# Patient Record
Sex: FEMALE | Race: BLACK OR AFRICAN AMERICAN | NOT HISPANIC OR LATINO | URBAN - METROPOLITAN AREA
[De-identification: names, ages, dates, MRNs, and addresses within clinical notes are randomized per-mention and may not be internally consistent; named-entity substitution may affect disease eponyms.]

---

## 2017-08-04 ENCOUNTER — OUTPATIENT (OUTPATIENT)
Dept: OUTPATIENT SERVICES | Facility: HOSPITAL | Age: 77
LOS: 1 days | End: 2017-08-04
Payer: MEDICARE

## 2017-08-04 VITALS
RESPIRATION RATE: 18 BRPM | HEIGHT: 62 IN | DIASTOLIC BLOOD PRESSURE: 86 MMHG | TEMPERATURE: 99 F | WEIGHT: 126.99 LBS | HEART RATE: 68 BPM | OXYGEN SATURATION: 97 % | SYSTOLIC BLOOD PRESSURE: 149 MMHG

## 2017-08-04 DIAGNOSIS — N18.5 CHRONIC KIDNEY DISEASE, STAGE 5: ICD-10-CM

## 2017-08-04 DIAGNOSIS — Z98.890 OTHER SPECIFIED POSTPROCEDURAL STATES: Chronic | ICD-10-CM

## 2017-08-04 DIAGNOSIS — Z01.818 ENCOUNTER FOR OTHER PREPROCEDURAL EXAMINATION: ICD-10-CM

## 2017-08-04 DIAGNOSIS — N18.9 CHRONIC KIDNEY DISEASE, UNSPECIFIED: ICD-10-CM

## 2017-08-04 DIAGNOSIS — H26.9 UNSPECIFIED CATARACT: Chronic | ICD-10-CM

## 2017-08-04 PROCEDURE — G0463: CPT

## 2017-08-04 NOTE — H&P PST ADULT - ASSESSMENT
77-year-old female with history of hypertension, dyslipidemia, chronic kidney disease comes to PST prior to upcoming surgery.

## 2017-08-04 NOTE — H&P PST ADULT - PMH
Chronic kidney disease    Dyslipidemia    History of hypertension Borderline diabetes mellitus    Chronic kidney disease    Dyslipidemia    History of hypertension

## 2017-08-04 NOTE — H&P PST ADULT - NSANTHOSAYNRD_GEN_A_CORE
No. GEORGINA screening performed.  STOP BANG Legend: 0-2 = LOW Risk; 3-4 = INTERMEDIATE Risk; 5-8 = HIGH Risk

## 2017-08-07 ENCOUNTER — OUTPATIENT (OUTPATIENT)
Dept: OUTPATIENT SERVICES | Facility: HOSPITAL | Age: 77
LOS: 1 days | Discharge: HOME | End: 2017-08-07
Payer: MEDICARE

## 2017-08-07 VITALS
DIASTOLIC BLOOD PRESSURE: 60 MMHG | RESPIRATION RATE: 17 BRPM | TEMPERATURE: 98 F | OXYGEN SATURATION: 100 % | HEART RATE: 55 BPM | SYSTOLIC BLOOD PRESSURE: 139 MMHG

## 2017-08-07 VITALS
SYSTOLIC BLOOD PRESSURE: 149 MMHG | RESPIRATION RATE: 17 BRPM | WEIGHT: 126.99 LBS | HEART RATE: 86 BPM | TEMPERATURE: 98 F | DIASTOLIC BLOOD PRESSURE: 75 MMHG | HEIGHT: 62 IN | OXYGEN SATURATION: 100 %

## 2017-08-07 DIAGNOSIS — Z98.890 OTHER SPECIFIED POSTPROCEDURAL STATES: Chronic | ICD-10-CM

## 2017-08-07 DIAGNOSIS — Z01.818 ENCOUNTER FOR OTHER PREPROCEDURAL EXAMINATION: ICD-10-CM

## 2017-08-07 DIAGNOSIS — N18.5 CHRONIC KIDNEY DISEASE, STAGE 5: ICD-10-CM

## 2017-08-07 DIAGNOSIS — H26.9 UNSPECIFIED CATARACT: Chronic | ICD-10-CM

## 2017-08-07 PROCEDURE — 36821 AV FUSION DIRECT ANY SITE: CPT | Mod: LT

## 2017-08-07 PROCEDURE — C1889: CPT

## 2017-08-07 RX ORDER — ACETAMINOPHEN WITH CODEINE 300MG-30MG
1 TABLET ORAL EVERY 4 HOURS
Qty: 0 | Refills: 0 | Status: DISCONTINUED | OUTPATIENT
Start: 2017-08-07 | End: 2017-08-07

## 2017-08-07 RX ORDER — ACETAMINOPHEN WITH CODEINE 300MG-30MG
1 TABLET ORAL
Qty: 12 | Refills: 0 | OUTPATIENT
Start: 2017-08-07 | End: 2017-08-10

## 2017-08-07 RX ORDER — SODIUM CHLORIDE 9 MG/ML
3 INJECTION INTRAMUSCULAR; INTRAVENOUS; SUBCUTANEOUS EVERY 8 HOURS
Qty: 0 | Refills: 0 | Status: DISCONTINUED | OUTPATIENT
Start: 2017-08-07 | End: 2017-08-07

## 2017-08-07 RX ORDER — ACETAMINOPHEN 500 MG
1 TABLET ORAL
Qty: 0 | Refills: 0 | COMMUNITY

## 2017-08-07 NOTE — ASU DISCHARGE PLAN (ADULT/PEDIATRIC). - NOTIFY
Persistent Nausea and Vomiting/Bleeding that does not stop/Swelling that continues/Pain not relieved by Medications/Numbness, tingling

## 2017-08-07 NOTE — BRIEF OPERATIVE NOTE - PROCEDURE
AV fistula  08/07/2017    Active  CGASIDDHARTHN <<-----Click on this checkbox to enter Procedure AV fistula  08/07/2017  Creation AV Fistula  [employer]we[employer] Forearm  Active  Henry Garza

## 2017-08-07 NOTE — BRIEF OPERATIVE NOTE - POST-OP DX
End stage renal disease  08/07/2017    Active  Henry Garza End stage renal disease  08/07/2017  End Stage Renal Disease  Active  Angel Drake

## 2017-08-08 ENCOUNTER — TRANSCRIPTION ENCOUNTER (OUTPATIENT)
Age: 77
End: 2017-08-08

## 2017-08-11 DIAGNOSIS — E11.22 TYPE 2 DIABETES MELLITUS WITH DIABETIC CHRONIC KIDNEY DISEASE: ICD-10-CM

## 2017-08-11 DIAGNOSIS — Z87.891 PERSONAL HISTORY OF NICOTINE DEPENDENCE: ICD-10-CM

## 2017-08-11 DIAGNOSIS — I12.0 HYPERTENSIVE CHRONIC KIDNEY DISEASE WITH STAGE 5 CHRONIC KIDNEY DISEASE OR END STAGE RENAL DISEASE: ICD-10-CM

## 2017-08-11 DIAGNOSIS — N18.6 END STAGE RENAL DISEASE: ICD-10-CM

## 2017-10-05 PROBLEM — Z00.00 ENCOUNTER FOR PREVENTIVE HEALTH EXAMINATION: Status: ACTIVE | Noted: 2017-10-05

## 2017-10-23 ENCOUNTER — APPOINTMENT (OUTPATIENT)
Dept: VASCULAR SURGERY | Facility: CLINIC | Age: 77
End: 2017-10-23
Payer: MEDICARE

## 2017-10-23 VITALS
BODY MASS INDEX: 23 KG/M2 | TEMPERATURE: 98 F | WEIGHT: 125 LBS | SYSTOLIC BLOOD PRESSURE: 166 MMHG | HEART RATE: 89 BPM | DIASTOLIC BLOOD PRESSURE: 94 MMHG | HEIGHT: 62 IN

## 2017-10-23 DIAGNOSIS — F15.90 OTHER STIMULANT USE, UNSPECIFIED, UNCOMPLICATED: ICD-10-CM

## 2017-10-23 DIAGNOSIS — Z87.891 PERSONAL HISTORY OF NICOTINE DEPENDENCE: ICD-10-CM

## 2017-10-23 DIAGNOSIS — Z86.79 PERSONAL HISTORY OF OTHER DISEASES OF THE CIRCULATORY SYSTEM: ICD-10-CM

## 2017-10-23 DIAGNOSIS — Z86.39 PERSONAL HISTORY OF OTHER ENDOCRINE, NUTRITIONAL AND METABOLIC DISEASE: ICD-10-CM

## 2017-10-23 DIAGNOSIS — Q78.2 OSTEOPETROSIS: ICD-10-CM

## 2017-10-23 DIAGNOSIS — Z86.69 PERSONAL HISTORY OF OTHER DISEASES OF THE NERVOUS SYSTEM AND SENSE ORGANS: ICD-10-CM

## 2017-10-23 DIAGNOSIS — Z86.2 PERSONAL HISTORY OF DISEASES OF THE BLOOD AND BLOOD-FORMING ORGANS AND CERTAIN DISORDERS INVOLVING THE IMMUNE MECHANISM: ICD-10-CM

## 2017-10-23 DIAGNOSIS — R73.03 PREDIABETES.: ICD-10-CM

## 2017-10-23 DIAGNOSIS — I10 ESSENTIAL (PRIMARY) HYPERTENSION: ICD-10-CM

## 2017-10-23 DIAGNOSIS — I77.0 ARTERIOVENOUS FISTULA, ACQUIRED: ICD-10-CM

## 2017-10-23 PROCEDURE — 93990 DOPPLER FLOW TESTING: CPT

## 2017-10-23 PROCEDURE — 99204 OFFICE O/P NEW MOD 45 MIN: CPT

## 2017-10-23 PROCEDURE — G0365: CPT | Mod: 59

## 2017-10-23 RX ORDER — VITAMIN B COMPLEX
CAPSULE ORAL
Qty: 30 | Refills: 0 | Status: ACTIVE | COMMUNITY
Start: 2016-11-30

## 2017-10-23 RX ORDER — VITAMIN K2 90 MCG
125 MCG CAPSULE ORAL
Refills: 0 | Status: ACTIVE | COMMUNITY

## 2017-10-23 RX ORDER — VITAMIN B COMPLEX
CAPSULE ORAL
Refills: 0 | Status: ACTIVE | COMMUNITY

## 2017-10-23 RX ORDER — ATORVASTATIN CALCIUM 20 MG/1
20 TABLET, FILM COATED ORAL
Qty: 30 | Refills: 0 | Status: ACTIVE | COMMUNITY
Start: 2017-06-16

## 2017-11-20 ENCOUNTER — OUTPATIENT (OUTPATIENT)
Dept: OUTPATIENT SERVICES | Facility: HOSPITAL | Age: 77
LOS: 1 days | End: 2017-11-20
Payer: MEDICARE

## 2017-11-20 VITALS
SYSTOLIC BLOOD PRESSURE: 134 MMHG | RESPIRATION RATE: 16 BRPM | WEIGHT: 125 LBS | DIASTOLIC BLOOD PRESSURE: 87 MMHG | OXYGEN SATURATION: 99 % | HEIGHT: 62 IN | HEART RATE: 66 BPM | TEMPERATURE: 98 F

## 2017-11-20 DIAGNOSIS — Z01.818 ENCOUNTER FOR OTHER PREPROCEDURAL EXAMINATION: ICD-10-CM

## 2017-11-20 DIAGNOSIS — Z98.890 OTHER SPECIFIED POSTPROCEDURAL STATES: Chronic | ICD-10-CM

## 2017-11-20 DIAGNOSIS — I77.0 ARTERIOVENOUS FISTULA, ACQUIRED: Chronic | ICD-10-CM

## 2017-11-20 DIAGNOSIS — N18.6 END STAGE RENAL DISEASE: ICD-10-CM

## 2017-11-20 DIAGNOSIS — H26.9 UNSPECIFIED CATARACT: Chronic | ICD-10-CM

## 2017-11-20 PROCEDURE — G0463: CPT

## 2017-11-20 RX ORDER — SODIUM CHLORIDE 9 MG/ML
3 INJECTION INTRAMUSCULAR; INTRAVENOUS; SUBCUTANEOUS EVERY 8 HOURS
Qty: 0 | Refills: 0 | Status: DISCONTINUED | OUTPATIENT
Start: 2017-11-30 | End: 2017-12-01

## 2017-11-20 NOTE — H&P PST ADULT - PROBLEM SELECTOR PLAN 1
Scheduled for left basilic arteriovenous fistula on 11/30/2017. Preoperative instructions discussed and given to patient. Verbalized understanding of instructions

## 2017-11-20 NOTE — H&P PST ADULT - GASTROINTESTINAL DETAILS
no distention/no rigidity/no organomegaly/no masses palpable/bowel sounds normal/no bruit/soft/no rebound tenderness/nontender/no guarding

## 2017-11-20 NOTE — H&P PST ADULT - ACTIVITY
Walks >3 blocks and climbs >2 FOS w/o complications Walks >3 blocks and avoids going upstairs due to dyspnea

## 2017-11-20 NOTE — H&P PST ADULT - RS GEN PE MLT RESP DETAILS PC
airway patent/no intercostal retractions/no rales/good air movement/clear to auscultation bilaterally/no subcutaneous emphysema/no chest wall tenderness/no wheezes/no rhonchi/respirations non-labored/breath sounds equal

## 2017-11-20 NOTE — H&P PST ADULT - ASSESSMENT
76 y/o female with PMH of HTN,  HLD, Anemia of chronic disease, Stage 1V CKD, ERSD scheduled for left basilic arteriovenous fistula on 11/30/2017. Patient is at moderate risk for planned procedure

## 2017-11-20 NOTE — H&P PST ADULT - PSH
Cataracts, bilateral    History of colonoscopy A-V fistula  left arm - 8/7/2017  Cataracts, bilateral  w/left lens implant  History of colonoscopy

## 2017-11-20 NOTE — H&P PST ADULT - PRO PAIN LIFE ADAPT
inability to sleep/decreased activity level/inability or reluctance to perform ADLs/inability to enjoy life

## 2017-11-20 NOTE — H&P PST ADULT - NEGATIVE CARDIOVASCULAR SYMPTOMS
no orthopnea/no palpitations/no peripheral edema/no chest pain/no dyspnea on exertion/no claudication

## 2017-11-20 NOTE — H&P PST ADULT - NEGATIVE RESPIRATORY AND THORAX SYMPTOMS
no cough/no hemoptysis/no dyspnea/no wheezing/no pleuritic chest pain no hemoptysis/no pleuritic chest pain/no wheezing/no dyspnea

## 2017-11-20 NOTE — H&P PST ADULT - HISTORY OF PRESENT ILLNESS
76 y/o female with PMH of HTN,  HLD, Anemia of chronic disease, ESRD on hemodialysis since      is here today for presurgical evaluation. She is scheduled for left basilic arteriovenous fistula on 11/30/2017 78 y/o female with PMH of HTN,  HLD, Anemia of chronic disease, Stage 1V CKD, is here today for presurgical evaluation. She is scheduled for left basilic arteriovenous fistula on 11/30/2017, in preparation to start hemodialysis 76 y/o female with PMH of HTN, HLD, Anemia of chronic disease, Stage 1V CKD, is here today for presurgical evaluation. She is scheduled for left basilic arteriovenous fistula on 11/30/2017, in preparation for hemodialysis

## 2017-11-30 ENCOUNTER — APPOINTMENT (OUTPATIENT)
Dept: VASCULAR SURGERY | Facility: HOSPITAL | Age: 77
End: 2017-11-30

## 2017-11-30 ENCOUNTER — INPATIENT (INPATIENT)
Facility: HOSPITAL | Age: 77
LOS: 0 days | Discharge: HOME | DRG: 673 | End: 2017-12-01
Attending: SURGERY | Admitting: SURGERY
Payer: MEDICARE

## 2017-11-30 VITALS
WEIGHT: 125 LBS | HEIGHT: 62 IN | DIASTOLIC BLOOD PRESSURE: 79 MMHG | OXYGEN SATURATION: 100 % | TEMPERATURE: 98 F | HEART RATE: 84 BPM | SYSTOLIC BLOOD PRESSURE: 131 MMHG | RESPIRATION RATE: 16 BRPM

## 2017-11-30 DIAGNOSIS — N18.6 END STAGE RENAL DISEASE: ICD-10-CM

## 2017-11-30 DIAGNOSIS — Z98.890 OTHER SPECIFIED POSTPROCEDURAL STATES: Chronic | ICD-10-CM

## 2017-11-30 DIAGNOSIS — Z01.818 ENCOUNTER FOR OTHER PREPROCEDURAL EXAMINATION: ICD-10-CM

## 2017-11-30 DIAGNOSIS — I77.0 ARTERIOVENOUS FISTULA, ACQUIRED: Chronic | ICD-10-CM

## 2017-11-30 DIAGNOSIS — H26.9 UNSPECIFIED CATARACT: Chronic | ICD-10-CM

## 2017-11-30 DIAGNOSIS — N18.4 CHRONIC KIDNEY DISEASE, STAGE 4 (SEVERE): ICD-10-CM

## 2017-11-30 LAB
ANION GAP SERPL CALC-SCNC: 10 MMOL/L — SIGNIFICANT CHANGE UP (ref 5–17)
BUN SERPL-MCNC: 42 MG/DL — HIGH (ref 7–18)
CALCIUM SERPL-MCNC: 9.5 MG/DL — SIGNIFICANT CHANGE UP (ref 8.4–10.5)
CHLORIDE SERPL-SCNC: 109 MMOL/L — HIGH (ref 96–108)
CO2 SERPL-SCNC: 21 MMOL/L — LOW (ref 22–31)
CREAT SERPL-MCNC: 3.07 MG/DL — HIGH (ref 0.5–1.3)
GLUCOSE BLDC GLUCOMTR-MCNC: 110 MG/DL — HIGH (ref 70–99)
GLUCOSE SERPL-MCNC: 108 MG/DL — HIGH (ref 70–99)
POTASSIUM SERPL-MCNC: 3.9 MMOL/L — SIGNIFICANT CHANGE UP (ref 3.5–5.3)
POTASSIUM SERPL-SCNC: 3.9 MMOL/L — SIGNIFICANT CHANGE UP (ref 3.5–5.3)
SODIUM SERPL-SCNC: 140 MMOL/L — SIGNIFICANT CHANGE UP (ref 135–145)

## 2017-11-30 RX ORDER — OXYCODONE AND ACETAMINOPHEN 5; 325 MG/1; MG/1
1 TABLET ORAL EVERY 6 HOURS
Qty: 0 | Refills: 0 | Status: DISCONTINUED | OUTPATIENT
Start: 2017-11-30 | End: 2017-12-01

## 2017-11-30 RX ORDER — ATORVASTATIN CALCIUM 80 MG/1
20 TABLET, FILM COATED ORAL AT BEDTIME
Qty: 0 | Refills: 0 | Status: DISCONTINUED | OUTPATIENT
Start: 2017-11-30 | End: 2017-12-01

## 2017-11-30 RX ORDER — AMLODIPINE BESYLATE 2.5 MG/1
5 TABLET ORAL DAILY
Qty: 0 | Refills: 0 | Status: DISCONTINUED | OUTPATIENT
Start: 2017-11-30 | End: 2017-12-01

## 2017-11-30 RX ORDER — INFLUENZA VIRUS VACCINE 15; 15; 15; 15 UG/.5ML; UG/.5ML; UG/.5ML; UG/.5ML
0.5 SUSPENSION INTRAMUSCULAR ONCE
Qty: 0 | Refills: 0 | Status: DISCONTINUED | OUTPATIENT
Start: 2017-11-30 | End: 2017-12-01

## 2017-11-30 RX ORDER — FOLIC ACID 0.8 MG
1 TABLET ORAL
Qty: 0 | Refills: 0 | COMMUNITY

## 2017-11-30 RX ORDER — FOLIC ACID 0.8 MG
1 TABLET ORAL DAILY
Qty: 0 | Refills: 0 | Status: DISCONTINUED | OUTPATIENT
Start: 2017-11-30 | End: 2017-12-01

## 2017-11-30 RX ORDER — FERROUS SULFATE 325(65) MG
325 TABLET ORAL
Qty: 0 | Refills: 0 | Status: DISCONTINUED | OUTPATIENT
Start: 2017-11-30 | End: 2017-12-01

## 2017-11-30 RX ORDER — HYDROMORPHONE HYDROCHLORIDE 2 MG/ML
0.3 INJECTION INTRAMUSCULAR; INTRAVENOUS; SUBCUTANEOUS
Qty: 0 | Refills: 0 | Status: DISCONTINUED | OUTPATIENT
Start: 2017-11-30 | End: 2017-11-30

## 2017-11-30 RX ORDER — FERROUS SULFATE 325(65) MG
1 TABLET ORAL
Qty: 0 | Refills: 0 | COMMUNITY

## 2017-11-30 RX ADMIN — Medication 325 MILLIGRAM(S): at 17:07

## 2017-11-30 RX ADMIN — ATORVASTATIN CALCIUM 20 MILLIGRAM(S): 80 TABLET, FILM COATED ORAL at 21:13

## 2017-11-30 RX ADMIN — SODIUM CHLORIDE 3 MILLILITER(S): 9 INJECTION INTRAMUSCULAR; INTRAVENOUS; SUBCUTANEOUS at 21:11

## 2017-11-30 RX ADMIN — SODIUM CHLORIDE 3 MILLILITER(S): 9 INJECTION INTRAMUSCULAR; INTRAVENOUS; SUBCUTANEOUS at 08:52

## 2017-11-30 NOTE — ASU DISCHARGE PLAN (ADULT/PEDIATRIC). - NOTIFY
Pain not relieved by Medications/Numbness, tingling/Fever greater than 101/Bleeding that does not stop/Numbness, color, or temperature change to extremity/Persistent Nausea and Vomiting Fever greater than 101/Pain not relieved by Medications/Swelling that continues/Numbness, color, or temperature change to extremity/Numbness, tingling/Persistent Nausea and Vomiting/Bleeding that does not stop

## 2017-11-30 NOTE — PROGRESS NOTE ADULT - SUBJECTIVE AND OBJECTIVE BOX
POSTOP NOTE  Patient is doing okay.        Vital Signs Last 24 Hrs  T(C): 37.4 (30 Nov 2017 21:32), Max: 37.4 (30 Nov 2017 21:32)  T(F): 99.3 (30 Nov 2017 21:32), Max: 99.3 (30 Nov 2017 21:32)  HR: 79 (30 Nov 2017 21:32) (75 - 92)  BP: 116/48 (30 Nov 2017 21:32) (115/65 - 131/79)  BP(mean): 73 (30 Nov 2017 12:47) (73 - 73)  RR: 15 (30 Nov 2017 21:32) (12 - 17)  SpO2: 96% (30 Nov 2017 21:32) (95% - 100%)    Daily Height in cm: 157.48 (30 Nov 2017 08:46)    Daily     I&O's Detail      MEDICATIONS  (STANDING):  amLODIPine   Tablet 5 milliGRAM(s) Oral daily  atorvastatin 20 milliGRAM(s) Oral at bedtime  ferrous    sulfate 325 milliGRAM(s) Oral three times a day with meals  folic acid 1 milliGRAM(s) Oral daily  influenza   Vaccine 0.5 milliLiter(s) IntraMuscular once  sodium chloride 0.9% lock flush 3 milliLiter(s) IV Push every 8 hours  vitamin B complex with vitamin C 1 Tablet(s) Oral daily    MEDICATIONS  (PRN):  oxyCODONE    5 mG/acetaminophen 325 mG 1 Tablet(s) Oral every 6 hours PRN Severe Pain (7 - 10)      PHYSICAL EXAM:  GENERAL: In no acute distress  CHEST/LUNG: Clear to percussion bilaterally; Normal respiratory effort  HEART: Regular rate and rhythm  ABDOMEN: Soft, Nontender, Nondistended; Bowel sounds present  EXTREMITIES:  Lt Basilic AVF; Moderate serosanguinous dressing soilage, EMRE serosanguinous    LABS:    Neutrophil %:   11-30    140  |  109<H>  |  42<H>  ----------------------------<  108<H>  3.9   |  21<L>  |  3.07<H>    Ca    9.5      30 Nov 2017 08:51            RADIOLOGY & ADDITIONAL STUDIES:

## 2017-11-30 NOTE — ASU DISCHARGE PLAN (ADULT/PEDIATRIC). - NURSING INSTRUCTIONS
instruct pt on drain care KEEP DRESSING DRY UNTIL SEEN BY DR Klein.   Empty drain as instructed prior to discharge

## 2017-11-30 NOTE — PATIENT PROFILE ADULT. - VISION (WITH CORRECTIVE LENSES IF THE PATIENT USUALLY WEARS THEM):
Partially impaired: cannot see medication labels or newsprint, but can see obstacles in path, and the surrounding layout; can count fingers at arm's length/wears glasses for reading

## 2017-11-30 NOTE — ASU DISCHARGE PLAN (ADULT/PEDIATRIC). - MEDICATION SUMMARY - MEDICATIONS TO TAKE
I will START or STAY ON the medications listed below when I get home from the hospital:    oxyCODONE-acetaminophen 5 mg-325 mg oral tablet  -- 1 tab(s) by mouth every 6 hours, As needed, Severe Pain (7 - 10) MDD:4 tabs  -- Indication: For pain    atorvastatin 20 mg oral tablet  -- 1 tab(s) by mouth once a day  -- Indication: For high cholesterol    amLODIPine 5 mg oral tablet  -- 1 tab(s) by mouth once a day  -- Indication: For high blood pressure    ferrous sulfate  -- 1 tab(s) by mouth 3 times a day  -- Indication: For anemia    B Complex 50  -- 1 tab(s) by mouth once a day  -- Indication: For suppliment    folic acid 1 mg oral tablet  -- 1 tab(s) by mouth once a day  -- Indication: For suppliment

## 2017-11-30 NOTE — ASU DISCHARGE PLAN (ADULT/PEDIATRIC). - FOLLOWUP APPOINTMENT CLINIC/PHYSICIAN
Dr Klein ([facility]) 12/15/17 @ 11:15 Dr Klein ([place]) 12/15/17 @ 11:15

## 2017-11-30 NOTE — ASU DISCHARGE PLAN (ADULT/PEDIATRIC). - COMMENTS
appointment on 12/4/17 in [place] office   [address] [address]  appointment on 12/15/17 in [facility] office  [address], first floor between pharmacy and the [facility] office address   [address] [facility], first floor between pharmacy and the [facility]

## 2017-12-01 ENCOUNTER — TRANSCRIPTION ENCOUNTER (OUTPATIENT)
Age: 77
End: 2017-12-01

## 2017-12-01 ENCOUNTER — APPOINTMENT (OUTPATIENT)
Dept: VASCULAR SURGERY | Facility: CLINIC | Age: 77
End: 2017-12-01

## 2017-12-01 VITALS
SYSTOLIC BLOOD PRESSURE: 136 MMHG | TEMPERATURE: 99 F | HEART RATE: 86 BPM | DIASTOLIC BLOOD PRESSURE: 69 MMHG | OXYGEN SATURATION: 100 % | RESPIRATION RATE: 16 BRPM

## 2017-12-01 RX ADMIN — Medication 325 MILLIGRAM(S): at 08:42

## 2017-12-01 RX ADMIN — SODIUM CHLORIDE 3 MILLILITER(S): 9 INJECTION INTRAMUSCULAR; INTRAVENOUS; SUBCUTANEOUS at 06:00

## 2017-12-01 RX ADMIN — AMLODIPINE BESYLATE 5 MILLIGRAM(S): 2.5 TABLET ORAL at 06:01

## 2017-12-01 NOTE — PROGRESS NOTE ADULT - ASSESSMENT
78 y/o Female s/p lt AVF 11/30, POD#1    -DC EMRE in AM   -Pain medication PRN   -Renal Diet   -OOB/ Ambulate   -DVT ppx   -DC home today

## 2017-12-01 NOTE — DISCHARGE NOTE ADULT - MEDICATION SUMMARY - MEDICATIONS TO TAKE
I will START or STAY ON the medications listed below when I get home from the hospital:    oxyCODONE-acetaminophen 5 mg-325 mg oral tablet  -- 1 tab(s) by mouth every 6 hours, As needed, Severe Pain (7 - 10) MDD:4 tabs  -- Indication: For pain    atorvastatin 20 mg oral tablet  -- 1 tab(s) by mouth once a day  -- Indication: For as directed    amLODIPine 5 mg oral tablet  -- 1 tab(s) by mouth once a day  -- Indication: For as directed    ferrous sulfate  -- 1 tab(s) by mouth 3 times a day  -- Indication: For as directed    B Complex 50  -- 1 tab(s) by mouth once a day  -- Indication: For as directed    folic acid 1 mg oral tablet  -- 1 tab(s) by mouth once a day  -- Indication: For as directed

## 2017-12-01 NOTE — DISCHARGE NOTE ADULT - HOSPITAL COURSE
77 year old female with esrd underwent creation of left AV fistula on 11/30. MERE drain was left in place and removed on post-op day 1. Patient did wekk post-op and was discharged home with pain medicine and appropriate follow-up.

## 2017-12-01 NOTE — DISCHARGE NOTE ADULT - CARE PLAN
Principal Discharge DX:	A-V fistula  Goal:	resume regular activities  Instructions for follow-up, activity and diet:	follow-up with Dr. Klein in office in one week, cleanse wound with soap and water daily, cover with dry gauze

## 2017-12-01 NOTE — DISCHARGE NOTE ADULT - PLAN OF CARE
resume regular activities follow-up with Dr. Klein in office in one week, cleanse wound with soap and water daily, cover with dry gauze

## 2017-12-01 NOTE — DISCHARGE NOTE ADULT - CARE PROVIDER_API CALL
Zen Klein), Surgery; Vascular Surgery  [address]  [address]  [place] [address]  Phone: [phone]  Fax: [phone]

## 2017-12-01 NOTE — PROGRESS NOTE ADULT - SUBJECTIVE AND OBJECTIVE BOX
INTERVAL HPI/OVERNIGHT EVENTS:    Pt s/p lt AVF 11/30, POD#1. Pt seen and examined at bedside. Admits to incisional pain, denies numbness or tingling to the Lt UE. Denies fever, chills, SOB or CP.     Vital Signs Last 24 Hrs  T(C): 36.8 (01 Dec 2017 05:52), Max: 37.4 (30 Nov 2017 21:32)  T(F): 98.3 (01 Dec 2017 05:52), Max: 99.3 (30 Nov 2017 21:32)  HR: 73 (01 Dec 2017 05:52) (73 - 92)  BP: 120/55 (01 Dec 2017 05:52) (113/54 - 131/79)  BP(mean): 73 (30 Nov 2017 12:47) (73 - 73)  RR: 16 (01 Dec 2017 05:52) (12 - 17)  SpO2: 97% (01 Dec 2017 05:52) (95% - 100%)  I&O's Detail    30 Nov 2017 07:01  -  01 Dec 2017 07:00  --------------------------------------------------------  IN:  Total IN: 0 mL    OUT:    Bulb: 10 mL  Total OUT: 10 mL    Total NET: -10 mL        sodium chloride 0.9% lock flush 3 milliLiter(s) IV Push every 8 hours      Physical Exam  General: AAOx3, No acute distress  Skin: No jaundice, no icterus  Extremities: Lt UE, AVF w/ good thrill, dressing min saturated w/ ss output, min TTP, EMRE i n place, 10 cc/ ss output, motor and sensory intact

## 2017-12-01 NOTE — DISCHARGE NOTE ADULT - PATIENT PORTAL LINK FT
“You can access the [facility] Patient Portal, offered by [facility], by registering with the following website: [email or web address]”

## 2017-12-05 ENCOUNTER — TRANSCRIPTION ENCOUNTER (OUTPATIENT)
Age: 77
End: 2017-12-05

## 2017-12-11 DIAGNOSIS — N18.6 END STAGE RENAL DISEASE: ICD-10-CM

## 2017-12-11 DIAGNOSIS — E78.5 HYPERLIPIDEMIA, UNSPECIFIED: ICD-10-CM

## 2017-12-11 DIAGNOSIS — E11.22 TYPE 2 DIABETES MELLITUS WITH DIABETIC CHRONIC KIDNEY DISEASE: ICD-10-CM

## 2017-12-11 DIAGNOSIS — I12.0 HYPERTENSIVE CHRONIC KIDNEY DISEASE WITH STAGE 5 CHRONIC KIDNEY DISEASE OR END STAGE RENAL DISEASE: ICD-10-CM

## 2017-12-11 DIAGNOSIS — D64.9 ANEMIA, UNSPECIFIED: ICD-10-CM

## 2017-12-15 ENCOUNTER — APPOINTMENT (OUTPATIENT)
Dept: VASCULAR SURGERY | Facility: CLINIC | Age: 77
End: 2017-12-15
Payer: MEDICARE

## 2017-12-15 VITALS
WEIGHT: 125 LBS | HEIGHT: 62 IN | SYSTOLIC BLOOD PRESSURE: 144 MMHG | HEART RATE: 80 BPM | DIASTOLIC BLOOD PRESSURE: 72 MMHG | BODY MASS INDEX: 23 KG/M2 | TEMPERATURE: 98.3 F

## 2017-12-15 PROCEDURE — 99024 POSTOP FOLLOW-UP VISIT: CPT

## 2017-12-19 PROCEDURE — 82962 GLUCOSE BLOOD TEST: CPT

## 2017-12-19 PROCEDURE — C1889: CPT

## 2017-12-19 PROCEDURE — 80048 BASIC METABOLIC PNL TOTAL CA: CPT

## 2018-01-19 ENCOUNTER — APPOINTMENT (OUTPATIENT)
Dept: VASCULAR SURGERY | Facility: CLINIC | Age: 78
End: 2018-01-19
Payer: MEDICARE

## 2018-01-19 VITALS
HEART RATE: 75 BPM | HEIGHT: 62 IN | BODY MASS INDEX: 21.16 KG/M2 | WEIGHT: 115 LBS | DIASTOLIC BLOOD PRESSURE: 81 MMHG | TEMPERATURE: 98.6 F | SYSTOLIC BLOOD PRESSURE: 181 MMHG

## 2018-01-19 PROCEDURE — 93990 DOPPLER FLOW TESTING: CPT

## 2018-01-19 PROCEDURE — 99024 POSTOP FOLLOW-UP VISIT: CPT

## 2018-03-16 ENCOUNTER — APPOINTMENT (OUTPATIENT)
Dept: VASCULAR SURGERY | Facility: CLINIC | Age: 78
End: 2018-03-16
Payer: MEDICARE

## 2018-03-16 VITALS
WEIGHT: 125 LBS | BODY MASS INDEX: 23 KG/M2 | SYSTOLIC BLOOD PRESSURE: 145 MMHG | DIASTOLIC BLOOD PRESSURE: 75 MMHG | OXYGEN SATURATION: 99 % | HEIGHT: 62 IN | HEART RATE: 77 BPM

## 2018-03-16 PROCEDURE — 99214 OFFICE O/P EST MOD 30 MIN: CPT

## 2018-03-16 PROCEDURE — 93990 DOPPLER FLOW TESTING: CPT

## 2018-06-15 ENCOUNTER — APPOINTMENT (OUTPATIENT)
Dept: VASCULAR SURGERY | Facility: CLINIC | Age: 78
End: 2018-06-15
Payer: MEDICARE

## 2018-06-15 VITALS
BODY MASS INDEX: 23.37 KG/M2 | WEIGHT: 127 LBS | SYSTOLIC BLOOD PRESSURE: 136 MMHG | HEART RATE: 88 BPM | DIASTOLIC BLOOD PRESSURE: 73 MMHG | TEMPERATURE: 98.4 F | HEIGHT: 62 IN

## 2018-06-15 PROCEDURE — 93990 DOPPLER FLOW TESTING: CPT

## 2018-06-15 PROCEDURE — 99214 OFFICE O/P EST MOD 30 MIN: CPT

## 2018-07-17 NOTE — PATIENT PROFILE ADULT. - LAST TOBACCO USE (DD-MM-YY)
87M with diastolic HF, HTN, DM, dementia presents with SOB 87M with diastolic HF, HTN, HLC, NIDDM2, dementia presents with SOB for the past 3 days, started after having to strain for constipation.  Hx mostly obtained from wife since patient with dementia.  Patient had a hard time sleeping the night prior, unable to sleep with 2 pillows, sat up most of the night, and still had trouble moving around in the house earlier today associated with abd distention and LE edema, wife called PMD, who advised to come to ED for further evaluation.  Denied sick contact, fever/chill, dysuria, chest pain/palpitation or syncope.  Stated to be compliant with meds and diet. 87M with diastolic HF, HTN, HLC, hypothyroid, DM2, dementia presents with SOB for the past 3 days, started after having to strain for constipation.  Hx mostly obtained from wife since patient with dementia.  Patient had a hard time sleeping the night prior, unable to sleep with 2 pillows, sat up most of the night, and still had trouble moving around in the house earlier today associated with abd distention and LE edema, wife called PMD, who advised to come to ED for further evaluation.  Denied sick contact, fever/chill, dysuria, chest pain/palpitation or syncope.  Stated to be compliant with meds and diet. 07-Aug-2012

## 2018-08-12 NOTE — DISCHARGE NOTE ADULT - LAUNCH MEDICATION RECONCILIATION
<<-----Click here for Discharge Medication Review
Regular rate and rhythm, Heart sounds S1 S2 present, no murmurs, rubs or gallops

## 2018-09-28 ENCOUNTER — APPOINTMENT (OUTPATIENT)
Dept: VASCULAR SURGERY | Facility: CLINIC | Age: 78
End: 2018-09-28
Payer: MEDICARE

## 2018-09-28 PROBLEM — N18.9 CHRONIC KIDNEY DISEASE, UNSPECIFIED: Chronic | Status: ACTIVE | Noted: 2017-08-04

## 2018-09-28 PROBLEM — R73.03 PREDIABETES: Chronic | Status: ACTIVE | Noted: 2017-08-04

## 2018-09-28 PROBLEM — E78.5 HYPERLIPIDEMIA, UNSPECIFIED: Chronic | Status: ACTIVE | Noted: 2017-08-04

## 2018-09-28 PROBLEM — Z86.79 PERSONAL HISTORY OF OTHER DISEASES OF THE CIRCULATORY SYSTEM: Chronic | Status: ACTIVE | Noted: 2017-08-04

## 2018-09-28 PROCEDURE — 99213 OFFICE O/P EST LOW 20 MIN: CPT

## 2018-09-28 PROCEDURE — 93990 DOPPLER FLOW TESTING: CPT

## 2019-01-04 ENCOUNTER — APPOINTMENT (OUTPATIENT)
Dept: VASCULAR SURGERY | Facility: CLINIC | Age: 79
End: 2019-01-04
Payer: MEDICARE

## 2019-01-04 PROCEDURE — 99214 OFFICE O/P EST MOD 30 MIN: CPT

## 2019-01-04 PROCEDURE — 93990 DOPPLER FLOW TESTING: CPT

## 2019-01-15 ENCOUNTER — FORM ENCOUNTER (OUTPATIENT)
Age: 79
End: 2019-01-15

## 2019-01-15 NOTE — DISCUSSION/SUMMARY
[Post Fistulogram/Intervention] : Post Fistulogram/Intervention: Site check for bleeding/hematoma, thrill/bruit. Vitals signs: On admission, then q15 mins x 2

## 2019-01-15 NOTE — REASON FOR VISIT
[Other ___] : a [unfilled] visit for [Inadequate Flow within AVF] : inadequate flow within AVF [Non-Maturing Fistula] : non-maturing fistula

## 2019-01-16 ENCOUNTER — APPOINTMENT (OUTPATIENT)
Dept: ENDOVASCULAR SURGERY | Facility: CLINIC | Age: 79
End: 2019-01-16
Payer: MEDICARE

## 2019-01-16 VITALS
TEMPERATURE: 97 F | HEART RATE: 74 BPM | OXYGEN SATURATION: 97 % | HEIGHT: 62 IN | SYSTOLIC BLOOD PRESSURE: 158 MMHG | DIASTOLIC BLOOD PRESSURE: 71 MMHG | WEIGHT: 127 LBS | BODY MASS INDEX: 23.37 KG/M2 | RESPIRATION RATE: 16 BRPM

## 2019-01-16 PROCEDURE — 36902Z: CUSTOM

## 2019-01-16 RX ORDER — MECLIZINE HYDROCHLORIDE 25 MG/1
25 TABLET ORAL
Qty: 30 | Refills: 0 | Status: DISCONTINUED | COMMUNITY
Start: 2017-06-16 | End: 2019-01-16

## 2019-01-16 RX ORDER — AMLODIPINE BESYLATE 5 MG/1
5 TABLET ORAL
Refills: 0 | Status: DISCONTINUED | COMMUNITY
End: 2019-01-16

## 2019-01-16 RX ORDER — CHLORHEXIDINE GLUCONATE 4 %
325 (65 FE) LIQUID (ML) TOPICAL
Qty: 90 | Refills: 0 | Status: DISCONTINUED | COMMUNITY
Start: 2017-06-16 | End: 2019-01-16

## 2019-01-16 RX ORDER — MECLIZINE HYDROCHLORIDE 25 MG/1
25 TABLET ORAL
Refills: 0 | Status: DISCONTINUED | COMMUNITY
End: 2019-01-16

## 2019-01-16 RX ORDER — OXYCODONE AND ACETAMINOPHEN 5; 325 MG/1; MG/1
5-325 TABLET ORAL
Qty: 15 | Refills: 0 | Status: DISCONTINUED | COMMUNITY
Start: 2017-11-30 | End: 2019-01-16

## 2019-01-16 RX ORDER — ACETAMINOPHEN AND CODEINE 300; 30 MG/1; MG/1
300-30 TABLET ORAL
Qty: 12 | Refills: 0 | Status: DISCONTINUED | COMMUNITY
Start: 2017-08-07 | End: 2019-01-16

## 2019-01-16 RX ORDER — FOLIC ACID 1 MG/1
1 TABLET ORAL
Qty: 30 | Refills: 0 | Status: DISCONTINUED | COMMUNITY
Start: 2017-06-16 | End: 2019-01-16

## 2019-01-16 RX ORDER — AMLODIPINE BESYLATE 5 MG/1
5 TABLET ORAL
Qty: 30 | Refills: 0 | Status: DISCONTINUED | COMMUNITY
Start: 2016-09-05 | End: 2019-01-16

## 2019-01-16 NOTE — HISTORY OF PRESENT ILLNESS
[] : left basilic fistula [FreeTextEntry1] : basilic transposition fistula 11/20/2017 by Dr. Klein [FreeTextEntry5] : 1/15/2019 [FreeTextEntry6] : Dr. Isaac

## 2019-01-16 NOTE — PAST MEDICAL HISTORY
[Increasing age ( >40 years old)] : Increasing age ( >40 years old) [No therapy indicated for cases scheduled for less than one hour] : No therapy indicated for cases scheduled for less than one hour. [FreeTextEntry1] : Malignant Hyperthermia Screening Tool and Risk of Bleeding Assessment\par \par Ms. URSULA MÉNDEZ denies family history of unexpected death following Anesthesia or Exercise.\par Denies Family history of Malignant Hyperthermia, Muscle or Neuromuscular disorder and High Temperature following exercise.\par \par Ms. URSULA MÉNDEZ denies history of Muscle Spasm, Dark or Chocolate - Colored urine and Unanticipated fever immediately following anesthesia or serious exercise. \par Ms. MÉNDEZ also denies bleeding tendencies/ Risks of Bleeding.\par

## 2019-04-18 ENCOUNTER — OTHER (OUTPATIENT)
Age: 79
End: 2019-04-18

## 2019-04-19 ENCOUNTER — APPOINTMENT (OUTPATIENT)
Dept: VASCULAR SURGERY | Facility: CLINIC | Age: 79
End: 2019-04-19

## 2019-04-22 ENCOUNTER — APPOINTMENT (OUTPATIENT)
Dept: VASCULAR SURGERY | Facility: CLINIC | Age: 79
End: 2019-04-22

## 2019-05-14 ENCOUNTER — APPOINTMENT (OUTPATIENT)
Dept: ENDOVASCULAR SURGERY | Facility: CLINIC | Age: 79
End: 2019-05-14

## 2019-05-20 ENCOUNTER — FORM ENCOUNTER (OUTPATIENT)
Age: 79
End: 2019-05-20

## 2019-05-21 ENCOUNTER — APPOINTMENT (OUTPATIENT)
Dept: ENDOVASCULAR SURGERY | Facility: CLINIC | Age: 79
End: 2019-05-21
Payer: MEDICARE

## 2019-05-21 VITALS
HEIGHT: 62 IN | OXYGEN SATURATION: 99 % | WEIGHT: 127 LBS | SYSTOLIC BLOOD PRESSURE: 144 MMHG | TEMPERATURE: 97.5 F | DIASTOLIC BLOOD PRESSURE: 70 MMHG | RESPIRATION RATE: 16 BRPM | HEART RATE: 66 BPM | BODY MASS INDEX: 23.37 KG/M2

## 2019-05-21 PROCEDURE — 36903Z: CUSTOM

## 2019-05-21 RX ORDER — ASPIRIN 81 MG
81 TABLET, DELAYED RELEASE (ENTERIC COATED) ORAL
Refills: 0 | Status: ACTIVE | COMMUNITY

## 2019-05-26 NOTE — HISTORY OF PRESENT ILLNESS
[] : left basilic fistula [FreeTextEntry1] : 11/30/2017 Dr. Klein [FreeTextEntry4] : yesterday [FreeTextEntry5] : yesterday [FreeTextEntry6] : Dr. Law

## 2019-05-26 NOTE — PROCEDURE
[Resume diet] : resume diet [Site check for bleeding/hematoma/thrill/bruit] : Site check for bleeding/hematoma/thrill/bruit [Vital signs on admission the q 15 mins x2] : Vital signs on admission the q 15 mins x2 [FreeTextEntry1] : left arm fistula/fistulogram/angioplasty/stent

## 2019-06-18 ENCOUNTER — OTHER (OUTPATIENT)
Age: 79
End: 2019-06-18

## 2019-08-16 ENCOUNTER — APPOINTMENT (OUTPATIENT)
Dept: VASCULAR SURGERY | Facility: CLINIC | Age: 79
End: 2019-08-16

## 2019-08-20 ENCOUNTER — APPOINTMENT (OUTPATIENT)
Dept: VASCULAR SURGERY | Facility: CLINIC | Age: 79
End: 2019-08-20
Payer: MEDICARE

## 2019-08-20 VITALS
HEART RATE: 70 BPM | BODY MASS INDEX: 23 KG/M2 | HEIGHT: 62 IN | DIASTOLIC BLOOD PRESSURE: 77 MMHG | SYSTOLIC BLOOD PRESSURE: 144 MMHG | WEIGHT: 125 LBS | TEMPERATURE: 97.6 F

## 2019-08-20 PROCEDURE — 93990 DOPPLER FLOW TESTING: CPT

## 2019-08-20 PROCEDURE — 99213 OFFICE O/P EST LOW 20 MIN: CPT

## 2019-08-20 NOTE — PHYSICAL EXAM
[Thrill] : thrill [Hand well perfused] : hand well perfused [2+] : left 2+ [Normal] : coordination grossly intact, no focal deficits [Pulsatile Thrill] : no pulsatile thrill [Aneurysm] : no aneurysm [Ulcer] : no ulcer [de-identified] : intact

## 2019-08-20 NOTE — DISCUSSION/SUMMARY
[FreeTextEntry1] : 78 yo female with history of esrd on hd presents for followup of left upper extremity avf with clotting while on hd.  duplex shows 75% stenosis at the juxta anastomosis with flow rate of 472\par will arrange for fistulogram in the next week or 2

## 2019-08-20 NOTE — HISTORY OF PRESENT ILLNESS
[] : left basilic fistula [FreeTextEntry1] : 80 yo female with history of esrd on hd presents for followup of left upper extremity avf.  pt reports episodes of clotting while on hd

## 2019-09-11 ENCOUNTER — FORM ENCOUNTER (OUTPATIENT)
Age: 79
End: 2019-09-11

## 2019-09-12 ENCOUNTER — APPOINTMENT (OUTPATIENT)
Dept: ENDOVASCULAR SURGERY | Facility: CLINIC | Age: 79
End: 2019-09-12
Payer: MEDICARE

## 2019-09-12 VITALS
WEIGHT: 125 LBS | RESPIRATION RATE: 15 BRPM | TEMPERATURE: 98.4 F | SYSTOLIC BLOOD PRESSURE: 156 MMHG | HEART RATE: 81 BPM | DIASTOLIC BLOOD PRESSURE: 105 MMHG | BODY MASS INDEX: 24.54 KG/M2 | OXYGEN SATURATION: 99 % | HEIGHT: 60 IN

## 2019-09-12 PROCEDURE — 36215Z: CUSTOM | Mod: 59

## 2019-09-12 PROCEDURE — 36902Z: CUSTOM

## 2019-09-12 RX ORDER — AMLODIPINE BESYLATE 10 MG/1
10 TABLET ORAL
Qty: 30 | Refills: 0 | Status: DISCONTINUED | COMMUNITY
Start: 2018-02-05 | End: 2019-09-12

## 2019-09-13 NOTE — PAST MEDICAL HISTORY
[No therapy indicated for cases scheduled for less than one hour] : No therapy indicated for cases scheduled for less than one hour. [Increasing age ( >40 years old)] : Increasing age ( >40 years old) [FreeTextEntry1] : Malignant Hyperthermia Screening Tool and Risk of Bleeding Assessment\par \par Ms. URSULA MÉNDEZ denies family history of unexpected death following Anesthesia or Exercise.\par Denies Family history of Malignant Hyperthermia, Muscle or Neuromuscular disorder and High Temperature following exercise.\par \par Ms. URSULA MÉNDEZ denies history of Muscle Spasm, Dark or Chocolate - Colored urine and Unanticipated fever immediately following anesthesia or serious exercise. \par Ms. MÉNDEZ also denies bleeding tendencies/ Risks of Bleeding.\par

## 2019-09-13 NOTE — HISTORY OF PRESENT ILLNESS
[] : left basilic fistula [FreeTextEntry1] : 11/30/2017 Dr. Klein [FreeTextEntry4] : yesterday [FreeTextEntry5] : yesterday at 8 pm [FreeTextEntry6] : Dr. Law

## 2019-09-20 ENCOUNTER — APPOINTMENT (OUTPATIENT)
Dept: NEUROLOGY | Facility: CLINIC | Age: 79
End: 2019-09-20
Payer: MEDICARE

## 2019-09-20 VITALS
BODY MASS INDEX: 22.38 KG/M2 | WEIGHT: 114 LBS | DIASTOLIC BLOOD PRESSURE: 68 MMHG | HEART RATE: 93 BPM | HEIGHT: 60 IN | SYSTOLIC BLOOD PRESSURE: 132 MMHG

## 2019-09-20 DIAGNOSIS — R25.2 CRAMP AND SPASM: ICD-10-CM

## 2019-09-20 PROCEDURE — 99203 OFFICE O/P NEW LOW 30 MIN: CPT

## 2019-09-21 NOTE — HISTORY OF PRESENT ILLNESS
[FreeTextEntry1] : 79 yr-old woman on hemodialysis has been c/o leg cramps, often upon completing dialysis. Has been on hemodialysis past 5 months , 3 days per week.

## 2019-09-21 NOTE — PHYSICAL EXAM
[Person] : oriented to person [Place] : oriented to place [Time] : oriented to time [Concentration Intact] : normal concentrating ability [Naming Objects] : no difficulty naming common objects [Visual Intact] : visual attention was ~T not ~L decreased [Repeating Phrases] : no difficulty repeating a phrase [Writing A Sentence] : no difficulty writing a sentence [Comprehension] : comprehension intact [Fluency] : fluency intact [Reading] : reading intact [Past History] : adequate knowledge of personal past history [Cranial Nerves Optic (II)] : visual acuity intact bilaterally,  visual fields full to confrontation, pupils equal round and reactive to light [Cranial Nerves Trigeminal (V)] : facial sensation intact symmetrically [Cranial Nerves Oculomotor (III)] : extraocular motion intact [Cranial Nerves Vestibulocochlear (VIII)] : hearing was intact bilaterally [Cranial Nerves Facial (VII)] : face symmetrical [Cranial Nerves Glossopharyngeal (IX)] : tongue and palate midline [Cranial Nerves Accessory (XI - Cranial And Spinal)] : head turning and shoulder shrug symmetric [Cranial Nerves Hypoglossal (XII)] : there was no tongue deviation with protrusion [Motor Tone] : muscle tone was normal in all four extremities [Motor Strength] : muscle strength was normal in all four extremities [No Muscle Atrophy] : normal bulk in all four extremities [Sensation Tactile Decrease] : light touch was intact [Abnormal Walk] : normal gait [Balance] : balance was intact [Past-pointing] : there was no past-pointing [Tremor] : no tremor present [2+] : Ankle jerk right 2+ [Plantar Reflex Right Only] : normal on the right [Plantar Reflex Left Only] : normal on the left

## 2019-12-10 ENCOUNTER — APPOINTMENT (OUTPATIENT)
Dept: VASCULAR SURGERY | Facility: CLINIC | Age: 79
End: 2019-12-10
Payer: MEDICARE

## 2019-12-10 PROCEDURE — 99213 OFFICE O/P EST LOW 20 MIN: CPT

## 2019-12-10 PROCEDURE — 93990 DOPPLER FLOW TESTING: CPT

## 2019-12-10 NOTE — HISTORY OF PRESENT ILLNESS
[FreeTextEntry1] : 78 yo female with history of esrd on hd presents for followup of left upper extremity avf.  pt reports episodes of clotting while on hd [] : left basilic fistula

## 2019-12-10 NOTE — PHYSICAL EXAM
[Pulsatile Thrill] : no pulsatile thrill [Aneurysm] : no aneurysm [Thrill] : thrill [Hand well perfused] : hand well perfused [Ulcer] : no ulcer [2+] : left 2+ [Normal] : coordination grossly intact, no focal deficits [de-identified] : intact

## 2019-12-10 NOTE — DISCUSSION/SUMMARY
[FreeTextEntry1] : 80 yo female with history of esrd on hd presents for followup of left upper extremity avf \par No significant stenosis at this time. Patient with a volume flow greater than 1100. Followup in 3-4 months.

## 2020-03-25 NOTE — PATIENT PROFILE ADULT. - NS PRO ABUSE SCREEN AFRAID ANYONE YN
Chart review completed for refill of gilenya indicates no medication or significant health changes since last pharmacist counseling. No questions for the pharmacist. Medication shipped to home via [facility] for delivery on 3.27.20.    Tejinder GundersonD.  Specialty Pharmacy Coordinator  [facility]     no

## 2020-03-31 ENCOUNTER — APPOINTMENT (OUTPATIENT)
Dept: NEUROLOGY | Facility: CLINIC | Age: 80
End: 2020-03-31

## 2020-04-13 NOTE — ASU DISCHARGE PLAN (ADULT/PEDIATRIC). - RETURN TO WORK
Yes No

## 2020-06-04 ENCOUNTER — APPOINTMENT (OUTPATIENT)
Dept: VASCULAR SURGERY | Facility: CLINIC | Age: 80
End: 2020-06-04

## 2020-06-18 ENCOUNTER — APPOINTMENT (OUTPATIENT)
Dept: VASCULAR SURGERY | Facility: CLINIC | Age: 80
End: 2020-06-18
Payer: MEDICARE

## 2020-06-18 VITALS — TEMPERATURE: 96.8 F | BODY MASS INDEX: 22.38 KG/M2 | HEIGHT: 60 IN | WEIGHT: 114 LBS

## 2020-06-18 PROCEDURE — 99213 OFFICE O/P EST LOW 20 MIN: CPT

## 2020-06-18 PROCEDURE — 93990 DOPPLER FLOW TESTING: CPT

## 2020-06-18 NOTE — DISCUSSION/SUMMARY
[FreeTextEntry1] : 81 yo female with history of esrd on hd presents for followup of left upper extremity avf \par No significant stenosis at this time. Patient with a volume flow greater than 1100. Followup in 3-4 months.

## 2020-06-18 NOTE — PHYSICAL EXAM
[Thrill] : thrill [Pulsatile Thrill] : no pulsatile thrill [Aneurysm] : no aneurysm [Hand well perfused] : hand well perfused [Ulcer] : no ulcer [2+] : left 2+ [Normal] : coordination grossly intact, no focal deficits [de-identified] : intact

## 2020-06-18 NOTE — HISTORY OF PRESENT ILLNESS
[FreeTextEntry1] : 81 yo female with history of esrd on hd presents for followup of left upper extremity avf.  pt reports no issues at this time [] : left basilic fistula

## 2020-07-01 NOTE — DISCHARGE NOTE ADULT - PRINCIPAL DIAGNOSIS
PRINCIPAL PROCEDURE  Procedure: First rib resection  Findings and Treatment: A-V fistula (4) rarely moist

## 2020-07-08 ENCOUNTER — FORM ENCOUNTER (OUTPATIENT)
Age: 80
End: 2020-07-08

## 2020-07-09 ENCOUNTER — APPOINTMENT (OUTPATIENT)
Dept: ENDOVASCULAR SURGERY | Facility: CLINIC | Age: 80
End: 2020-07-09
Payer: MEDICARE

## 2020-07-09 VITALS
RESPIRATION RATE: 16 BRPM | HEIGHT: 60 IN | DIASTOLIC BLOOD PRESSURE: 75 MMHG | TEMPERATURE: 98 F | BODY MASS INDEX: 22.38 KG/M2 | SYSTOLIC BLOOD PRESSURE: 136 MMHG | OXYGEN SATURATION: 97 % | WEIGHT: 114 LBS | HEART RATE: 86 BPM

## 2020-07-09 PROCEDURE — 36902Z: CUSTOM

## 2020-07-09 PROCEDURE — 36215Z: CUSTOM | Mod: 59

## 2020-07-10 NOTE — HISTORY OF PRESENT ILLNESS
[] : left basilic fistula [FreeTextEntry4] : yesterday [FreeTextEntry1] : 11/30/2017 Dr. Klein [FreeTextEntry5] : yesterday at 8 pm [FreeTextEntry6] : Dr. Law

## 2020-07-14 ENCOUNTER — APPOINTMENT (OUTPATIENT)
Dept: NEUROLOGY | Facility: CLINIC | Age: 80
End: 2020-07-14
Payer: MEDICARE

## 2020-07-14 PROCEDURE — 99442: CPT

## 2020-07-14 NOTE — HISTORY OF PRESENT ILLNESS
[Home] : at home, [unfilled] , at the time of the visit. [Other Location: e.g. Home (Enter Location, City,State)___] : at [unfilled] [Verbal consent obtained from patient] : the patient, [unfilled] [FreeTextEntry1] : 80 yr-old woman seen 10 months ago with hx of ESRD   c/o leg cramps, often upon completing dialysis. Hemodialysis center has added cyclobenzaprine at start of dialysis and continued for 3 days with resolution of cramps. She has a new PCP, Dr. Jason Jones.  \par \par Advised to call me if any neurologic problems arise.

## 2020-08-24 NOTE — PATIENT PROFILE ADULT. - CENTRAL VENOUS CATHETER
Kristy Valerio DO  Board Certified Neurology

## 2020-09-26 ENCOUNTER — RX RENEWAL (OUTPATIENT)
Age: 80
End: 2020-09-26

## 2020-10-06 ENCOUNTER — APPOINTMENT (OUTPATIENT)
Dept: VASCULAR SURGERY | Facility: CLINIC | Age: 80
End: 2020-10-06
Payer: MEDICARE

## 2020-10-06 VITALS — TEMPERATURE: 96.2 F

## 2020-10-06 PROCEDURE — 93990 DOPPLER FLOW TESTING: CPT

## 2020-10-06 PROCEDURE — 99213 OFFICE O/P EST LOW 20 MIN: CPT

## 2020-10-06 NOTE — HISTORY OF PRESENT ILLNESS
[] : left basilic fistula [FreeTextEntry1] : 79 yo female with history of esrd on hd presents for followup of left upper extremity avf.  pt reports a couple of episodes of clotting while on hd with the last occurrence was yesterday.

## 2020-10-06 NOTE — DISCUSSION/SUMMARY
[FreeTextEntry1] : 81 yo female with history of esrd on hd presents for followup of left upper extremity avf with multiple episode of clotting while on hd \par \par duplex shows 50 % stenosis at the juxta anastomosis and mid upper arm with a 50-75% in stent stenosis of the proximal upper arm with flow rate of 794 \par \par will arrange for fistulogram given clotting while on hd in the setting of in stent stenosis

## 2020-10-06 NOTE — PHYSICAL EXAM
[Thrill] : thrill [Hand well perfused] : hand well perfused [2+] : left 2+ [Normal] : coordination grossly intact, no focal deficits [Pulsatile Thrill] : no pulsatile thrill [Aneurysm] : no aneurysm [Bleeding] : no bleeding [Ulcer] : no ulcer [de-identified] : intact

## 2020-10-12 DIAGNOSIS — Z01.818 ENCOUNTER FOR OTHER PREPROCEDURAL EXAMINATION: ICD-10-CM

## 2020-10-15 ENCOUNTER — APPOINTMENT (OUTPATIENT)
Dept: DISASTER EMERGENCY | Facility: CLINIC | Age: 80
End: 2020-10-15

## 2020-10-16 LAB — SARS-COV-2 N GENE NPH QL NAA+PROBE: NOT DETECTED

## 2020-10-19 ENCOUNTER — FORM ENCOUNTER (OUTPATIENT)
Age: 80
End: 2020-10-19

## 2020-10-20 ENCOUNTER — APPOINTMENT (OUTPATIENT)
Dept: ENDOVASCULAR SURGERY | Facility: CLINIC | Age: 80
End: 2020-10-20
Payer: MEDICARE

## 2020-10-20 VITALS
SYSTOLIC BLOOD PRESSURE: 151 MMHG | DIASTOLIC BLOOD PRESSURE: 79 MMHG | WEIGHT: 117 LBS | RESPIRATION RATE: 15 BRPM | HEIGHT: 60 IN | HEART RATE: 78 BPM | TEMPERATURE: 98.1 F | OXYGEN SATURATION: 97 % | BODY MASS INDEX: 22.97 KG/M2

## 2020-10-20 PROCEDURE — 36902Z: CUSTOM

## 2020-10-20 PROCEDURE — 99072 ADDL SUPL MATRL&STAF TM PHE: CPT

## 2020-10-20 RX ORDER — ATORVASTATIN CALCIUM 20 MG/1
20 TABLET, FILM COATED ORAL
Refills: 0 | Status: DISCONTINUED | COMMUNITY
End: 2020-10-20

## 2020-10-20 RX ORDER — ATORVASTATIN CALCIUM 80 MG/1
TABLET, FILM COATED ORAL
Refills: 0 | Status: ACTIVE | COMMUNITY

## 2020-10-22 RX ORDER — GABAPENTIN 100 MG/1
100 CAPSULE ORAL
Qty: 90 | Refills: 3 | Status: ACTIVE | COMMUNITY
Start: 2019-09-20 | End: 1900-01-01

## 2020-10-23 NOTE — HISTORY OF PRESENT ILLNESS
[] : left basilic fistula [FreeTextEntry1] : 11/30/2017 Dr. Klein [FreeTextEntry4] : yesterday [FreeTextEntry5] : yesterday at 8 pm [FreeTextEntry6] : Dr. Ham

## 2020-11-04 ENCOUNTER — INPATIENT (INPATIENT)
Facility: HOSPITAL | Age: 80
LOS: 1 days | Discharge: HOME HEALTH | End: 2020-11-06
Attending: INTERNAL MEDICINE | Admitting: INTERNAL MEDICINE
Payer: MEDICARE

## 2020-11-04 VITALS
HEART RATE: 93 BPM | DIASTOLIC BLOOD PRESSURE: 66 MMHG | SYSTOLIC BLOOD PRESSURE: 103 MMHG | RESPIRATION RATE: 20 BRPM | TEMPERATURE: 98 F | OXYGEN SATURATION: 98 %

## 2020-11-04 DIAGNOSIS — Z98.890 OTHER SPECIFIED POSTPROCEDURAL STATES: Chronic | ICD-10-CM

## 2020-11-04 DIAGNOSIS — I77.0 ARTERIOVENOUS FISTULA, ACQUIRED: Chronic | ICD-10-CM

## 2020-11-04 DIAGNOSIS — H26.9 UNSPECIFIED CATARACT: Chronic | ICD-10-CM

## 2020-11-04 LAB
ALBUMIN SERPL ELPH-MCNC: 4.6 G/DL — SIGNIFICANT CHANGE UP (ref 3.3–5)
ALP SERPL-CCNC: 74 U/L — SIGNIFICANT CHANGE UP (ref 40–120)
ALT FLD-CCNC: 15 U/L — SIGNIFICANT CHANGE UP (ref 4–33)
ANION GAP SERPL CALC-SCNC: 16 MMO/L — HIGH (ref 7–14)
AST SERPL-CCNC: 49 U/L — HIGH (ref 4–32)
BASOPHILS # BLD AUTO: 0.03 K/UL — SIGNIFICANT CHANGE UP (ref 0–0.2)
BASOPHILS NFR BLD AUTO: 0.3 % — SIGNIFICANT CHANGE UP (ref 0–2)
BILIRUB SERPL-MCNC: 0.4 MG/DL — SIGNIFICANT CHANGE UP (ref 0.2–1.2)
BUN SERPL-MCNC: 30 MG/DL — HIGH (ref 7–23)
CALCIUM SERPL-MCNC: 9.3 MG/DL — SIGNIFICANT CHANGE UP (ref 8.4–10.5)
CHLORIDE SERPL-SCNC: 91 MMOL/L — LOW (ref 98–107)
CO2 SERPL-SCNC: 29 MMOL/L — SIGNIFICANT CHANGE UP (ref 22–31)
CREAT SERPL-MCNC: 5.55 MG/DL — HIGH (ref 0.5–1.3)
EOSINOPHIL # BLD AUTO: 0 K/UL — SIGNIFICANT CHANGE UP (ref 0–0.5)
EOSINOPHIL NFR BLD AUTO: 0 % — SIGNIFICANT CHANGE UP (ref 0–6)
GLUCOSE SERPL-MCNC: 142 MG/DL — HIGH (ref 70–99)
HCT VFR BLD CALC: 41.7 % — SIGNIFICANT CHANGE UP (ref 34.5–45)
HGB BLD-MCNC: 12.9 G/DL — SIGNIFICANT CHANGE UP (ref 11.5–15.5)
IMM GRANULOCYTES NFR BLD AUTO: 0.3 % — SIGNIFICANT CHANGE UP (ref 0–1.5)
LYMPHOCYTES # BLD AUTO: 1.17 K/UL — SIGNIFICANT CHANGE UP (ref 1–3.3)
LYMPHOCYTES # BLD AUTO: 10.7 % — LOW (ref 13–44)
MAGNESIUM SERPL-MCNC: 2.4 MG/DL — SIGNIFICANT CHANGE UP (ref 1.6–2.6)
MCHC RBC-ENTMCNC: 29.7 PG — SIGNIFICANT CHANGE UP (ref 27–34)
MCHC RBC-ENTMCNC: 30.9 % — LOW (ref 32–36)
MCV RBC AUTO: 96.1 FL — SIGNIFICANT CHANGE UP (ref 80–100)
MONOCYTES # BLD AUTO: 0.77 K/UL — SIGNIFICANT CHANGE UP (ref 0–0.9)
MONOCYTES NFR BLD AUTO: 7 % — SIGNIFICANT CHANGE UP (ref 2–14)
NEUTROPHILS # BLD AUTO: 8.96 K/UL — HIGH (ref 1.8–7.4)
NEUTROPHILS NFR BLD AUTO: 81.7 % — HIGH (ref 43–77)
NRBC # FLD: 0 K/UL — SIGNIFICANT CHANGE UP (ref 0–0)
PHOSPHATE SERPL-MCNC: 4.6 MG/DL — HIGH (ref 2.5–4.5)
PLATELET # BLD AUTO: 180 K/UL — SIGNIFICANT CHANGE UP (ref 150–400)
PMV BLD: 10.6 FL — SIGNIFICANT CHANGE UP (ref 7–13)
POTASSIUM SERPL-MCNC: 4.2 MMOL/L — SIGNIFICANT CHANGE UP (ref 3.5–5.3)
POTASSIUM SERPL-SCNC: 4.2 MMOL/L — SIGNIFICANT CHANGE UP (ref 3.5–5.3)
PROT SERPL-MCNC: 9.1 G/DL — HIGH (ref 6–8.3)
RBC # BLD: 4.34 M/UL — SIGNIFICANT CHANGE UP (ref 3.8–5.2)
RBC # FLD: 14.8 % — HIGH (ref 10.3–14.5)
SODIUM SERPL-SCNC: 136 MMOL/L — SIGNIFICANT CHANGE UP (ref 135–145)
TROPONIN T, HIGH SENSITIVITY: 111 NG/L — CRITICAL HIGH (ref ?–14)
WBC # BLD: 10.96 K/UL — HIGH (ref 3.8–10.5)
WBC # FLD AUTO: 10.96 K/UL — HIGH (ref 3.8–10.5)

## 2020-11-04 PROCEDURE — 99285 EMERGENCY DEPT VISIT HI MDM: CPT

## 2020-11-04 PROCEDURE — 71045 X-RAY EXAM CHEST 1 VIEW: CPT | Mod: 26

## 2020-11-04 PROCEDURE — 70450 CT HEAD/BRAIN W/O DYE: CPT | Mod: 26

## 2020-11-04 RX ORDER — ACETAMINOPHEN 500 MG
650 TABLET ORAL ONCE
Refills: 0 | Status: COMPLETED | OUTPATIENT
Start: 2020-11-04 | End: 2020-11-04

## 2020-11-04 RX ADMIN — Medication 650 MILLIGRAM(S): at 23:25

## 2020-11-04 NOTE — ED PROVIDER NOTE - ATTENDING CONTRIBUTION TO CARE
I performed a history and physical exam of the patient and discussed their management with the resident. I reviewed the resident's note and agree with the documented findings and plan of care. I have edited as appropriate. My medical decision making and observations are found above.  NAD, ecchymosis to R forehead

## 2020-11-04 NOTE — ED ADULT TRIAGE NOTE - CHIEF COMPLAINT QUOTE
pt Sent from Dialysis for eval. of her right side renea s/p fall on Friday denies dizziness, no blur vision, no CP pt had 2 1/2 hr of treatment

## 2020-11-04 NOTE — ED ADULT NURSE NOTE - NSIMPLEMENTINTERV_GEN_ALL_ED
Implemented All Fall Risk Interventions:  [place] to call system. Call bell, personal items and telephone within reach. Instruct patient to call for assistance. Room bathroom lighting operational. Non-slip footwear when patient is off stretcher. Physically safe environment: no spills, clutter or unnecessary equipment. Stretcher in lowest position, wheels locked, appropriate side rails in place. Provide visual cue, wrist band, yellow gown, etc. Monitor gait and stability. Monitor for mental status changes and reorient to person, place, and time. Review medications for side effects contributing to fall risk. Reinforce activity limits and safety measures with patient and family.

## 2020-11-04 NOTE — ED PROVIDER NOTE - OBJECTIVE STATEMENT
80 F hx ESRD on HD M/W/F, on plavix for problems with LUE AVF, denies other medical hx including DM or HTN, p/w right forehead ecchymosis. Pt states 2 days ago had full HD and went home, later that night went to the bathroom and used the bathroom and then stood up to get changed into pajamas but then is not sure what happened but fell and remembers waking up on the floor; thinks she was down for up to 20 minutes; no soiling of self at that time. Denies prodromal chest pain sob or palpitations. Was able to get up on her own (lives alone) and since then ahs been walking at her baseline, went shopping yesterday, and is here at the suggestion of the dialysis center to get evaluated for ecchymosis. Presently she denies back pain, hip pain, abd pain, cp/sob. 80 F hx ESRD on HD M/W/F, on plavix for problems with LUE AVF, denies other medical hx including DM or HTN, p/w right forehead ecchymosis. Pt states 2 days ago had full HD and went home, later that night went to the bathroom and used the bathroom and then stood up to get changed into pajamas but then is not sure what happened but fell and remembers waking up on the floor; thinks she was down for up to 20 minutes; no soiling of self at that time. Denies prodromal chest pain sob or palpitations. Was able to get up on her own (lives alone) and since then ahs been walking at her baseline, went shopping yesterday, and is here at the suggestion of the dialysis center to get evaluated for ecchymosis. Presently she denies back pain, hip pain, abd pain, cp/sob.    PMD Dr JANE Jones  Card: Dr manley  Renal: Dr Marcum

## 2020-11-04 NOTE — ED PROVIDER NOTE - CLINICAL SUMMARY MEDICAL DECISION MAKING FREE TEXT BOX
79 yo F p/w syncope and fall, on with bruising of R forehead. Syncope may be due to dysrythmia vs metabolic abnormality vs post-mictruition syncope. Will obtain CT head to evaluate for possible trauma. Plan: labs, CT, EKG, reassess

## 2020-11-04 NOTE — ED PROVIDER NOTE - NEUROLOGICAL, MLM
A&O x3. CN 2-12 intact. Strength 5/5 throughout b/l UE and LE. Sensation intact to light touch throughout b/l UE and LE. Finger to nose intact.

## 2020-11-04 NOTE — ED ADULT NURSE NOTE - OBJECTIVE STATEMENT
Pt presents to the ED a&ox4 by stretcher sent by dialysis center for a syncopal episode in the home on Monday. Pt states she lost consciousness while in the bathroom and woke up 30mins-1hour later. Pt denies dizziness or lightheadedness before syncopal episode. Pt hit her head on a piece of furniture, ecchymosis and swelling noted to the right forehead. Pt denies headaches, chest pain, SOB, dizziness or lightheadedness at this time. Pt receives dialysis for CKD, fistula noted to the left arm, positive thrill and bruit present. Dialysis schedule Monday Wednesday Friday, received only 2 hours of dialysis today so she can come to the ED for eval. Breathing even and unlabored. NSR on the monitor. SaO2 98% on room air. Abd soft and nontender. Pt denies N/V/D. Skin clean dry and intact. Awaiting MD orders at this time. Will continue to monitor. Pt presents to the ED a&ox4 by stretcher sent by dialysis center for a syncopal episode in the home on Monday. Pt states she lost consciousness while in the bathroom and woke up 30mins-1hour later. Pt denies dizziness or lightheadedness before syncopal episode. Pt hit her head on a piece of furniture, ecchymosis and swelling noted to the right forehead. Pt denies headaches, changes in vision, chest pain, SOB, dizziness or lightheadedness at this time. Pt receives dialysis for CKD, fistula noted to the left arm, positive thrill and bruit present. Dialysis schedule Monday Wednesday Friday, received only 2 hours of dialysis today so she can come to the ED for eval. Breathing even and unlabored. NSR on the monitor. SaO2 98% on room air. Abd soft and nontender. Pt denies N/V/D. Skin clean dry and intact. Awaiting MD orders at this time. Will continue to monitor.

## 2020-11-05 DIAGNOSIS — Z29.9 ENCOUNTER FOR PROPHYLACTIC MEASURES, UNSPECIFIED: ICD-10-CM

## 2020-11-05 DIAGNOSIS — R55 SYNCOPE AND COLLAPSE: ICD-10-CM

## 2020-11-05 DIAGNOSIS — I77.0 ARTERIOVENOUS FISTULA, ACQUIRED: Chronic | ICD-10-CM

## 2020-11-05 DIAGNOSIS — N18.6 END STAGE RENAL DISEASE: ICD-10-CM

## 2020-11-05 LAB
ALBUMIN SERPL ELPH-MCNC: 4.4 G/DL — SIGNIFICANT CHANGE UP (ref 3.3–5)
ALP SERPL-CCNC: 75 U/L — SIGNIFICANT CHANGE UP (ref 40–120)
ALT FLD-CCNC: 16 U/L — SIGNIFICANT CHANGE UP (ref 4–33)
ANION GAP SERPL CALC-SCNC: 22 MMO/L — HIGH (ref 7–14)
AST SERPL-CCNC: 35 U/L — HIGH (ref 4–32)
B PERT DNA SPEC QL NAA+PROBE: SIGNIFICANT CHANGE UP
BILIRUB SERPL-MCNC: 0.3 MG/DL — SIGNIFICANT CHANGE UP (ref 0.2–1.2)
BUN SERPL-MCNC: 40 MG/DL — HIGH (ref 7–23)
C PNEUM DNA SPEC QL NAA+PROBE: SIGNIFICANT CHANGE UP
CALCIUM SERPL-MCNC: 9.5 MG/DL — SIGNIFICANT CHANGE UP (ref 8.4–10.5)
CHLORIDE SERPL-SCNC: 90 MMOL/L — LOW (ref 98–107)
CK MB BLD-MCNC: 2.3 — SIGNIFICANT CHANGE UP (ref 0–2.5)
CK MB BLD-MCNC: 4.42 NG/ML — SIGNIFICANT CHANGE UP (ref 1–4.7)
CK SERPL-CCNC: 195 U/L — HIGH (ref 25–170)
CO2 SERPL-SCNC: 24 MMOL/L — SIGNIFICANT CHANGE UP (ref 22–31)
CREAT SERPL-MCNC: 6.48 MG/DL — HIGH (ref 0.5–1.3)
FLUAV H1 2009 PAND RNA SPEC QL NAA+PROBE: SIGNIFICANT CHANGE UP
FLUAV H1 RNA SPEC QL NAA+PROBE: SIGNIFICANT CHANGE UP
FLUAV H3 RNA SPEC QL NAA+PROBE: SIGNIFICANT CHANGE UP
FLUAV SUBTYP SPEC NAA+PROBE: SIGNIFICANT CHANGE UP
FLUBV RNA SPEC QL NAA+PROBE: SIGNIFICANT CHANGE UP
GLUCOSE SERPL-MCNC: 114 MG/DL — HIGH (ref 70–99)
HADV DNA SPEC QL NAA+PROBE: SIGNIFICANT CHANGE UP
HCOV PNL SPEC NAA+PROBE: SIGNIFICANT CHANGE UP
HCT VFR BLD CALC: 40 % — SIGNIFICANT CHANGE UP (ref 34.5–45)
HGB BLD-MCNC: 12.7 G/DL — SIGNIFICANT CHANGE UP (ref 11.5–15.5)
HMPV RNA SPEC QL NAA+PROBE: SIGNIFICANT CHANGE UP
HPIV1 RNA SPEC QL NAA+PROBE: SIGNIFICANT CHANGE UP
HPIV2 RNA SPEC QL NAA+PROBE: SIGNIFICANT CHANGE UP
HPIV3 RNA SPEC QL NAA+PROBE: SIGNIFICANT CHANGE UP
HPIV4 RNA SPEC QL NAA+PROBE: SIGNIFICANT CHANGE UP
INR BLD: 0.99 — SIGNIFICANT CHANGE UP (ref 0.88–1.16)
MAGNESIUM SERPL-MCNC: 2.4 MG/DL — SIGNIFICANT CHANGE UP (ref 1.6–2.6)
MCHC RBC-ENTMCNC: 30.7 PG — SIGNIFICANT CHANGE UP (ref 27–34)
MCHC RBC-ENTMCNC: 31.8 % — LOW (ref 32–36)
MCV RBC AUTO: 96.6 FL — SIGNIFICANT CHANGE UP (ref 80–100)
NRBC # FLD: 0 K/UL — SIGNIFICANT CHANGE UP (ref 0–0)
PHOSPHATE SERPL-MCNC: 5.8 MG/DL — HIGH (ref 2.5–4.5)
PLATELET # BLD AUTO: 167 K/UL — SIGNIFICANT CHANGE UP (ref 150–400)
PMV BLD: 10.8 FL — SIGNIFICANT CHANGE UP (ref 7–13)
POTASSIUM SERPL-MCNC: 3.5 MMOL/L — SIGNIFICANT CHANGE UP (ref 3.5–5.3)
POTASSIUM SERPL-SCNC: 3.5 MMOL/L — SIGNIFICANT CHANGE UP (ref 3.5–5.3)
PROT SERPL-MCNC: 8.4 G/DL — HIGH (ref 6–8.3)
PROTHROM AB SERPL-ACNC: 11.3 SEC — SIGNIFICANT CHANGE UP (ref 10.6–13.6)
RAPID RVP RESULT: SIGNIFICANT CHANGE UP
RBC # BLD: 4.14 M/UL — SIGNIFICANT CHANGE UP (ref 3.8–5.2)
RBC # FLD: 14.7 % — HIGH (ref 10.3–14.5)
RSV RNA SPEC QL NAA+PROBE: SIGNIFICANT CHANGE UP
RV+EV RNA SPEC QL NAA+PROBE: SIGNIFICANT CHANGE UP
SARS-COV-2 RNA SPEC QL NAA+PROBE: SIGNIFICANT CHANGE UP
SODIUM SERPL-SCNC: 136 MMOL/L — SIGNIFICANT CHANGE UP (ref 135–145)
TROPONIN T, HIGH SENSITIVITY: 105 NG/L — CRITICAL HIGH (ref ?–14)
TROPONIN T, HIGH SENSITIVITY: 105 NG/L — CRITICAL HIGH (ref ?–14)
TSH SERPL-MCNC: 3.77 UIU/ML — SIGNIFICANT CHANGE UP (ref 0.27–4.2)
WBC # BLD: 10.4 K/UL — SIGNIFICANT CHANGE UP (ref 3.8–10.5)
WBC # FLD AUTO: 10.4 K/UL — SIGNIFICANT CHANGE UP (ref 3.8–10.5)

## 2020-11-05 PROCEDURE — 93306 TTE W/DOPPLER COMPLETE: CPT | Mod: 26

## 2020-11-05 PROCEDURE — 99223 1ST HOSP IP/OBS HIGH 75: CPT

## 2020-11-05 PROCEDURE — 12345: CPT | Mod: NC

## 2020-11-05 RX ORDER — CLOPIDOGREL BISULFATE 75 MG/1
1 TABLET, FILM COATED ORAL
Qty: 0 | Refills: 0 | DISCHARGE

## 2020-11-05 RX ORDER — ASPIRIN/CALCIUM CARB/MAGNESIUM 324 MG
81 TABLET ORAL DAILY
Refills: 0 | Status: DISCONTINUED | OUTPATIENT
Start: 2020-11-05 | End: 2020-11-06

## 2020-11-05 RX ORDER — FOLIC ACID 0.8 MG
1 TABLET ORAL DAILY
Refills: 0 | Status: DISCONTINUED | OUTPATIENT
Start: 2020-11-05 | End: 2020-11-06

## 2020-11-05 RX ORDER — CLOPIDOGREL BISULFATE 75 MG/1
75 TABLET, FILM COATED ORAL DAILY
Refills: 0 | Status: DISCONTINUED | OUTPATIENT
Start: 2020-11-05 | End: 2020-11-06

## 2020-11-05 RX ORDER — GABAPENTIN 400 MG/1
100 CAPSULE ORAL THREE TIMES A DAY
Refills: 0 | Status: DISCONTINUED | OUTPATIENT
Start: 2020-11-05 | End: 2020-11-06

## 2020-11-05 RX ORDER — ATORVASTATIN CALCIUM 80 MG/1
40 TABLET, FILM COATED ORAL AT BEDTIME
Refills: 0 | Status: DISCONTINUED | OUTPATIENT
Start: 2020-11-05 | End: 2020-11-06

## 2020-11-05 RX ORDER — ATORVASTATIN CALCIUM 80 MG/1
1 TABLET, FILM COATED ORAL
Qty: 0 | Refills: 0 | DISCHARGE

## 2020-11-05 RX ORDER — CYCLOBENZAPRINE HYDROCHLORIDE 10 MG/1
1 TABLET, FILM COATED ORAL
Qty: 0 | Refills: 0 | DISCHARGE

## 2020-11-05 RX ORDER — CYCLOBENZAPRINE HYDROCHLORIDE 10 MG/1
10 TABLET, FILM COATED ORAL
Refills: 0 | Status: DISCONTINUED | OUTPATIENT
Start: 2020-11-05 | End: 2020-11-06

## 2020-11-05 RX ORDER — HEPARIN SODIUM 5000 [USP'U]/ML
5000 INJECTION INTRAVENOUS; SUBCUTANEOUS EVERY 12 HOURS
Refills: 0 | Status: DISCONTINUED | OUTPATIENT
Start: 2020-11-05 | End: 2020-11-06

## 2020-11-05 RX ORDER — CHLORHEXIDINE GLUCONATE 213 G/1000ML
1 SOLUTION TOPICAL ONCE
Refills: 0 | Status: COMPLETED | OUTPATIENT
Start: 2020-11-05 | End: 2020-11-05

## 2020-11-05 RX ORDER — SEVELAMER CARBONATE 2400 MG/1
2 POWDER, FOR SUSPENSION ORAL
Qty: 0 | Refills: 0 | DISCHARGE

## 2020-11-05 RX ORDER — SEVELAMER CARBONATE 2400 MG/1
1600 POWDER, FOR SUSPENSION ORAL
Refills: 0 | Status: DISCONTINUED | OUTPATIENT
Start: 2020-11-05 | End: 2020-11-06

## 2020-11-05 RX ORDER — GABAPENTIN 400 MG/1
1 CAPSULE ORAL
Qty: 0 | Refills: 0 | DISCHARGE

## 2020-11-05 RX ORDER — ASPIRIN/CALCIUM CARB/MAGNESIUM 324 MG
1 TABLET ORAL
Qty: 0 | Refills: 0 | DISCHARGE

## 2020-11-05 RX ADMIN — GABAPENTIN 100 MILLIGRAM(S): 400 CAPSULE ORAL at 11:25

## 2020-11-05 RX ADMIN — Medication 650 MILLIGRAM(S): at 02:45

## 2020-11-05 RX ADMIN — Medication 1 MILLIGRAM(S): at 11:25

## 2020-11-05 RX ADMIN — GABAPENTIN 100 MILLIGRAM(S): 400 CAPSULE ORAL at 21:21

## 2020-11-05 RX ADMIN — Medication 1 TABLET(S): at 11:25

## 2020-11-05 RX ADMIN — SEVELAMER CARBONATE 1600 MILLIGRAM(S): 2400 POWDER, FOR SUSPENSION ORAL at 08:25

## 2020-11-05 RX ADMIN — SEVELAMER CARBONATE 1600 MILLIGRAM(S): 2400 POWDER, FOR SUSPENSION ORAL at 11:26

## 2020-11-05 RX ADMIN — Medication 81 MILLIGRAM(S): at 11:26

## 2020-11-05 RX ADMIN — CHLORHEXIDINE GLUCONATE 1 APPLICATION(S): 213 SOLUTION TOPICAL at 17:41

## 2020-11-05 RX ADMIN — ATORVASTATIN CALCIUM 40 MILLIGRAM(S): 80 TABLET, FILM COATED ORAL at 21:21

## 2020-11-05 RX ADMIN — HEPARIN SODIUM 5000 UNIT(S): 5000 INJECTION INTRAVENOUS; SUBCUTANEOUS at 17:41

## 2020-11-05 RX ADMIN — SEVELAMER CARBONATE 1600 MILLIGRAM(S): 2400 POWDER, FOR SUSPENSION ORAL at 17:41

## 2020-11-05 RX ADMIN — CLOPIDOGREL BISULFATE 75 MILLIGRAM(S): 75 TABLET, FILM COATED ORAL at 11:25

## 2020-11-05 NOTE — CONSULT NOTE ADULT - SUBJECTIVE AND OBJECTIVE BOX
[facility] Consult Note Nephrology - CONSULTATION NOTE - [phone] - Dr Rogers / Dr Mccullough / Dr Hinson / Dr Bolaños / Dr Helms / Dr Rojas / Dr Hahn / Dr Han    Patient is a 80y Female with ESRD on HD M/W/F (last HD On 11/04/2020), sent in from HD for recent syncopal episode on 11/2. LOC was without prodromal complaints. Denies dizziness/LH, chest pain, palpitations, shortness of breath, diaphoresis or nausea. Patient reports a prior history of orthostasis. She notes that she was changing out of her clothes getting ready for bed when she woke up on the floor of her bathroom. She believes she lost conscious for approximately 20-30 minutes. She lives alone and LOC was unwitnessed. She noted to be diaphoretic after awaking. Denies loss of bladder or bowel control. She is currently without complaint.  Hematoma noted on right forehead. She completed 2.5 hrs HD on 11/4, prior to arrival to hospital.       PAST MEDICAL & SURGICAL HISTORY:  History of rheumatic fever    ESRD on dialysis    Chronic interstitial nephritis    Borderline diabetes mellitus    Chronic kidney disease    Dyslipidemia    History of hypertension    AVF (arteriovenous fistula)  LUE    A-V fistula  left arm - 8/7/2017    History of colonoscopy    Cataracts, bilateral  w/left lens implant      Allergies:  No Known Allergies    Home Medications Reviewed  Hospital Medications:   MEDICATIONS  (STANDING):  aspirin enteric coated 81 milliGRAM(s) Oral daily  atorvastatin 40 milliGRAM(s) Oral at bedtime  clopidogrel Tablet 75 milliGRAM(s) Oral daily  folic acid 1 milliGRAM(s) Oral daily  gabapentin 100 milliGRAM(s) Oral three times a day  heparin   Injectable 5000 Unit(s) SubCutaneous every 12 hours  Nephro-katja 1 Tablet(s) Oral daily  sevelamer carbonate 1600 milliGRAM(s) Oral three times a day with meals    SOCIAL HISTORY:  Denies ETOh,Smoking,   FAMILY HISTORY:  No pertinent family history in first degree relatives    No pertinent family history in first degree relatives      REVIEW OF SYSTEMS:  CONSTITUTIONAL: No weakness, fevers or chills  EYES/ENT: No visual changes;  No vertigo or throat pain   NECK: No pain or stiffness  RESPIRATORY: No cough, wheezing, hemoptysis; No shortness of breath  CARDIOVASCULAR: No chest pain or palpitations.  GASTROINTESTINAL: No abdominal or epigastric pain. No nausea, vomiting, or hematemesis; No diarrhea or constipation. No melena or hematochezia.  GENITOURINARY: No dysuria, frequency, foamy urine, urinary urgency, incontinence or hematuria  NEUROLOGICAL: No numbness or weakness  SKIN: No itching, burning, rashes, or lesions   VASCULAR: No bilateral lower extremity edema.   All other review of systems is negative unless indicated above.    VITALS:  T(F): 98.1 (11-05-20 @ 05:37), Max: 98.4 (11-04-20 @ 21:58)  HR: 79 (11-05-20 @ 05:37)  BP: 119/74 (11-05-20 @ 05:37)  RR: 18 (11-05-20 @ 05:37)  SpO2: 97% (11-05-20 @ 05:37)  Wt(kg): --    Height (cm): 157.5 (11-05 @ 05:37)  Weight (kg): 51 (11-05 @ 05:37)  BMI (kg/m2): 20.6 (11-05 @ 05:37)  BSA (m2): 1.5 (11-05 @ 05:37)  PHYSICAL EXAM:  Constitutional: NAD  HEENT: anicteric sclera, oropharynx clear, MMM, Hematoma over right forehead.   Neck: No JVD  Respiratory: CTAB, no wheezes, rales or rhonchi  Cardiovascular: S1, S2, RRR  Gastrointestinal: BS+, soft, NT/ND  Extremities: No cyanosis or clubbing. No peripheral edema  Neurological: A/O x 3, no focal deficits  Psychiatric: Normal mood, normal affect  : No CVA tenderness. No iyer.   Skin: No rashes  Vascular Access: LUE AV access +thrill and bruit.     LABS:  11-05    136  |  90<L>  |  40<H>  ----------------------------<  114<H>  3.5   |  24  |  6.48<H>    Ca    9.5      05 Nov 2020 06:00  Phos  5.8     11-05  Mg     2.4     11-05    TPro  8.4<H>  /  Alb  4.4  /  TBili  0.3  /  DBili      /  AST  35<H>  /  ALT  16  /  AlkPhos  75  11-05    Creatinine Trend: 6.48 <--, 5.55 <--                        12.7   10.40 )-----------( 167      ( 05 Nov 2020 06:00 )             40.0     Urine Studies:      RADIOLOGY & ADDITIONAL STUDIES:  ra< from: CT Head No Cont (11.04.20 @ 20:39) >  IMPRESSION:    No right prefrontal scalp hematoma without foreign body or fracture. No acute intracranial bleeding.  Moderate chronic microvascular ischemic changes.    < end of copied text >  < from: Xray Chest 1 View- PORTABLE-Urgent (Xray Chest 1 View- PORTABLE-Urgent .) (11.04.20 @ 22:23) >  IMPRESSION:    Clear lungs.    < end of copied text >

## 2020-11-05 NOTE — PHYSICAL THERAPY INITIAL EVALUATION ADULT - PATIENT PROFILE REVIEW, REHAB EVAL
yes/Activity - Increase as tolerated. Spoke with Milka ONEILL RN, pt. ok to be seen for PT Evaluation.

## 2020-11-05 NOTE — CONSULT NOTE ADULT - ATTENDING COMMENTS
Neno Rogers MD  [facility]  Office [phone]  Ans Serv [phone] cell - [phone]

## 2020-11-05 NOTE — PHYSICAL THERAPY INITIAL EVALUATION ADULT - PERTINENT HX OF CURRENT PROBLEM, REHAB EVAL
Pt. admitted for syncope. Per radiology report, CT head revealed no right prefrontal scalp hematoma without foreign body or fracture. No acute intracranial bleeding. Moderate chronic microvascular ischemic changes.

## 2020-11-05 NOTE — PROGRESS NOTE ADULT - SUBJECTIVE AND OBJECTIVE BOX
[facility] Division of Hospital Medicine  Dona Shelton MD  Pager (M-F, 8A-5P): [phone]  Other Times:  [phone]    Patient is a 80y old  Female who presents with a chief complaint of syncope (05 Nov 2020 12:29)    SUBJECTIVE / OVERNIGHT EVENTS:  pt feels like her weakness has improved since coming in to the hospital.      MEDICATIONS  (STANDING):  aspirin enteric coated 81 milliGRAM(s) Oral daily  atorvastatin 40 milliGRAM(s) Oral at bedtime  chlorhexidine 4% Liquid 1 Application(s) Topical once  clopidogrel Tablet 75 milliGRAM(s) Oral daily  folic acid 1 milliGRAM(s) Oral daily  gabapentin 100 milliGRAM(s) Oral three times a day  heparin   Injectable 5000 Unit(s) SubCutaneous every 12 hours  Nephro-katja 1 Tablet(s) Oral daily  sevelamer carbonate 1600 milliGRAM(s) Oral three times a day with meals    MEDICATIONS  (PRN):  cyclobenzaprine 10 milliGRAM(s) Oral <User Schedule> PRN Muscle Spasm    CAPILLARY BLOOD GLUCOSE    I&O's Summary      PHYSICAL EXAM:  Vital Signs Last 24 Hrs  T(C): 36.7 (05 Nov 2020 05:37), Max: 36.9 (04 Nov 2020 21:58)  T(F): 98.1 (05 Nov 2020 05:37), Max: 98.4 (04 Nov 2020 21:58)  HR: 79 (05 Nov 2020 05:37) (79 - 93)  BP: 119/74 (05 Nov 2020 05:37) (103/66 - 140/72)  BP(mean): --  RR: 18 (05 Nov 2020 05:37) (18 - 21)  SpO2: 97% (05 Nov 2020 05:37) (97% - 100%)    CONSTITUTIONAL: NAD, well-developed, well-groomed  EYES: EOMI; conjunctiva and sclera clear  ENMT: Moist oral mucosa  RESPIRATORY: Normal respiratory effort; lungs are clear to auscultation bilaterally  CARDIOVASCULAR: Regular rate and rhythm, normal S1 and S2, no murmur; No lower extremity edema  ABDOMEN: Nontender to palpation, normoactive bowel sounds, no rebound/guarding  MUSCULOSKELETAL:   no clubbing or cyanosis of digits; no joint swelling or tenderness to palpation  PSYCH: A+O to person, place, and time; affect appropriate  NEUROLOGY: CN 2-12 are intact and symmetric; no gross sensory deficits   SKIN: No rashes; no palpable lesions    LABS:                        12.7   10.40 )-----------( 167      ( 05 Nov 2020 06:00 )             40.0     11-05    136  |  90<L>  |  40<H>  ----------------------------<  114<H>  3.5   |  24  |  6.48<H>    Ca    9.5      05 Nov 2020 06:00  Phos  5.8     11-05  Mg     2.4     11-05    TPro  8.4<H>  /  Alb  4.4  /  TBili  0.3  /  DBili  x   /  AST  35<H>  /  ALT  16  /  AlkPhos  75  11-05    PT/INR - ( 05 Nov 2020 06:00 )   PT: 11.3 SEC;   INR: 0.99         RADIOLOGY & ADDITIONAL TESTS:  Results Reviewed:   Imaging Personally Reviewed:  Electrocardiogram Personally Reviewed:    COORDINATION OF CARE:  Care Discussed with Consultants/Other Providers [Y/N]: Y  Prior or Outpatient Records Reviewed [Y/N]: Y

## 2020-11-05 NOTE — H&P ADULT - NSHPLABSRESULTS_GEN_ALL_CORE
12.9   10.96 )-----------( 180      ( 04 Nov 2020 21:50 )             41.7     11-04    136  |  91<L>  |  30<H>  ----------------------------<  142<H>  4.2   |  29  |  5.55<H>    Ca    9.3      04 Nov 2020 21:50  Phos  4.6     11-04  Mg     2.4     11-04    TPro  9.1<H>  /  Alb  4.6  /  TBili  0.4  /  DBili  x   /  AST  49<H>  /  ALT  15  /  AlkPhos  74  11-04    Troponin T, High Sensitivity: 105 ng/L (11.04.20 @ 23:30)  Troponin T, High Sensitivity: 111 ng/L (11.04.20 @ 21:50)    Rapid RVP Result: NotDetec (11.05.20 @ 01:28)  SARS-CoV-2: NotDetec (11.05.20 @ 01:28)    < from: CT Head No Cont (11.04.20 @ 20:39) >  Focal right prefrontal scalp swelling and hematoma without foreign body or underlying fracture. There is no acute intracranial mass-effect, hemorrhage, midline shift, or abnormal extra-axial fluid collection. Gray-white differentiation is maintained. Moderate chronic microvascular ischemic changes are noted. 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 Mild central volume loss without hydrocephalus. Basal cisterns are patent. Visualized paranasal sinuses and mastoid air cells are clear. Calvarium is intact.  IMPRESSION: No right prefrontal scalp hematoma without foreign body or fracture. No acute intracranial bleeding. Moderate chronic microvascular ischemic changes.  < end of copied text >    CXR personally reviewed - no focal consolidations    EKG personally reviewed - 84bpm NSR, LVH, TWI aVL, QTc 482ms 12.9   10.96 )-----------( 180      ( 04 Nov 2020 21:50 )             41.7     11-04    136  |  91<L>  |  30<H>  ----------------------------<  142<H>  4.2   |  29  |  5.55<H>    Ca    9.3      04 Nov 2020 21:50  Phos  4.6     11-04  Mg     2.4     11-04    TPro  9.1<H>  /  Alb  4.6  /  TBili  0.4  /  DBili  x   /  AST  49<H>  /  ALT  15  /  AlkPhos  74  11-04    Troponin T, High Sensitivity: 105 ng/L (11.04.20 @ 23:30)  Troponin T, High Sensitivity: 111 ng/L (11.04.20 @ 21:50)    Rapid RVP Result: NotDetec (11.05.20 @ 01:28)  SARS-CoV-2: NotDetec (11.05.20 @ 01:28)    < from: CT Head No Cont (11.04.20 @ 20:39) >  Focal right prefrontal scalp swelling and hematoma without foreign body or underlying fracture. There is no acute intracranial mass-effect, hemorrhage, midline shift, or abnormal extra-axial fluid collection. Gray-white differentiation is maintained. Moderate chronic microvascular ischemic changes are noted. 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 Mild central volume loss without hydrocephalus. Basal cisterns are patent. Visualized paranasal sinuses and mastoid air cells are clear. Calvarium is intact.  IMPRESSION: No right prefrontal scalp hematoma without foreign body or fracture. No acute intracranial bleeding. Moderate chronic microvascular ischemic changes.  < end of copied text >    CXR personally reviewed - no focal consolidations    EKG personally reviewed - 84bpm NSR, LVH, TWI in I and aVL, J point elevation V1; ST depression 1mm in V4-V5; QTc 482ms; no prior available in EMR for comparison

## 2020-11-05 NOTE — PROVIDER CONTACT NOTE (OTHER) - BACKGROUND
Patient is admitted from a syncopic fall 2 days ago. Patient has history of rheumatic fever, ESRD, HD

## 2020-11-05 NOTE — PROGRESS NOTE ADULT - PROBLEM SELECTOR PLAN 2
s/p HD on Wed, next due for HD on Friday - renal on board  c/w sevelamer pre-meals  c/w DAPT given history of AVF thrombosis s/p HD on Wed, next due for HD on Friday - renal on board  c/w sevelamer pre-meals  c/w DAPT given history of AVF thrombosis  - monitor AG

## 2020-11-05 NOTE — H&P ADULT - NSHPREVIEWOFSYSTEMS_GEN_ALL_CORE
REVIEW OF SYSTEMS:    CONSTITUTIONAL: No weakness, fevers or chills  EYES/ENT: No visual changes;  No dysphagia  NECK: No pain or stiffness  RESPIRATORY: No cough, wheezing, hemoptysis; No shortness of breath  CARDIOVASCULAR: No chest pain or palpitations; No lower extremity edema  GASTROINTESTINAL: No abdominal or epigastric pain. No nausea, vomiting, or hematemesis; No diarrhea or constipation. No melena or hematochezia.  GENITOURINARY: No dysuria, frequency or hematuria  NEUROLOGICAL: No numbness or weakness  MSK: ambulates without aid, (+)fall as above; intermittent R shoulder pain  SKIN: No itching, burning, rashes, or lesions   All other review of systems is negative unless indicated above. REVIEW OF SYSTEMS:    CONSTITUTIONAL: No weakness, fevers or chills  EYES/ENT: No visual changes;  No dysphagia  NECK: No pain or stiffness  RESPIRATORY: No cough, wheezing, hemoptysis; No shortness of breath  CARDIOVASCULAR: No chest pain or palpitations; No lower extremity edema  GASTROINTESTINAL: No abdominal or epigastric pain. No nausea, vomiting, or hematemesis; No diarrhea or constipation. No melena or hematochezia.  GENITOURINARY: No dysuria, frequency or hematuria  NEUROLOGICAL: No numbness or weakness  MSK: ambulates without aid, (+)fall as above; intermittent R shoulder pain; reports calf cramping reny w/HD  SKIN: No itching, burning, rashes, or lesions   All other review of systems is negative unless indicated above.

## 2020-11-05 NOTE — H&P ADULT - HISTORY OF PRESENT ILLNESS
80-year-old female with chronic interstitial nephritis/ESRD on HD M/W/F (last HD On 11/04/2020), presenting from HD for recent syncopal episode 2 days prior to presentation without preceding dizziness/LH, chest pain, palpitations, shortness of breath, diaphoresis or nausea. Patient reports a prior history of orthostasis. She notes that she was changing out of her clothes getting ready for bed when she woke up on the floor of her bathroom. She believes she lost conscious for approximately 20-30 minutes.     In the ED VS:  98.4  83-93  103-140/57-74  20  97-98%RA, received acetaminophen 650mg PO x1 80-year-old female with chronic interstitial nephritis/ESRD on HD M/W/F (last HD On 11/04/2020), presenting from HD for recent syncopal episode 2 days prior to presentation without preceding dizziness/LH, chest pain, palpitations, shortness of breath, diaphoresis or nausea. Patient reports a prior history of orthostasis. She notes that she was changing out of her clothes getting ready for bed when she woke up on the floor of her bathroom. She believes she lost conscious for approximately 20-30 minutes. She is currently without complaint.    In the ED VS:  98.4  83-93  103-140/57-74  20  97-98%RA, received acetaminophen 650mg PO x1

## 2020-11-05 NOTE — H&P ADULT - NSHPPHYSICALEXAM_GEN_ALL_CORE
PHYSICAL EXAM:  GENERAL: NAD, well-developed, thin  HEAD:  Atraumatic, R scalp ecchymosis/hematoma   EYES: L surgical pupil, conjunctiva and sclera clear  NECK: Supple, No JVD  CHEST/LUNG: Clear to auscultation bilaterally; No wheezes, rales or rhonchi  HEART: Regular rate and rhythm; No murmurs, rubs, or gallops, (+)S1, S2  ABDOMEN: Soft, Nontender, Nondistended; Normal Bowel sounds   EXTREMITIES:  2+ Peripheral Pulses, No clubbing, cyanosis, or edema  PSYCH: normal mood and affect  NEUROLOGY: AAOx3, non-focal  SKIN: No rashes or lesions PHYSICAL EXAM:  GENERAL: NAD, well-developed, thin  HEAD:  Atraumatic, R scalp ecchymosis/hematoma   EYES: L surgical pupil, conjunctiva and sclera clear  NECK: Supple, No JVD  CHEST/LUNG: Clear to auscultation bilaterally; No wheezes, rales or rhonchi  HEART: Regular rate and rhythm; No murmurs, rubs, or gallops, (+)S1, S2  ABDOMEN: Soft, Nontender, Nondistended; Normal Bowel sounds   EXTREMITIES:  2+ Peripheral Pulses, No clubbing, cyanosis, or edema  PSYCH: normal mood and affect  NEUROLOGY: AAOx3, non-focal, CN II-XII intact, FTN intact b/l, strength 5/5 x4 extremities, sensation grossly intact   SKIN: No rashes or lesions

## 2020-11-05 NOTE — H&P ADULT - PROBLEM SELECTOR PLAN 2
s/p HD on Wed, next due for HD on Friday  c/w sevelamer pre-meals  c/w DAPT given history of AVF thrombosis

## 2020-11-05 NOTE — H&P ADULT - ASSESSMENT
80-year-old female with chronic interstitial nephritis/ESRD on HD M/W/F (last HD On 11/04/2020), presenting from HD for recent syncopal episode

## 2020-11-05 NOTE — H&P ADULT - NSICDXPASTMEDICALHX_GEN_ALL_CORE_FT
PAST MEDICAL HISTORY:  Chronic interstitial nephritis     ESRD on dialysis     History of rheumatic fever

## 2020-11-05 NOTE — PHYSICAL THERAPY INITIAL EVALUATION ADULT - ADDITIONAL COMMENTS
Pt. was left seated at edge of bed post PT Evaluation, no apparent distress, all lines intact, call bell within reach.

## 2020-11-05 NOTE — CONSULT NOTE ADULT - ASSESSMENT
80y Female with ESRD on HD M/W/F (last HD On 11/04/2020), sent in from HD for recent syncopal episode on 11/2. Renal consult for HD management.

## 2020-11-05 NOTE — H&P ADULT - PROBLEM SELECTOR PLAN 1
monitor on tele  check orthostatics  fall precautions   negative delta trop  check TTE given history of rheumatic heart disease

## 2020-11-05 NOTE — PHYSICAL THERAPY INITIAL EVALUATION ADULT - CRITERIA FOR SKILLED THERAPEUTIC INTERVENTIONS
predicted duration of therapy intervention/rehab potential/anticipated equipment needs at discharge/impairments found/therapy frequency/anticipated discharge recommendation

## 2020-11-05 NOTE — CONSULT NOTE ADULT - PROBLEM SELECTOR RECOMMENDATION 9
Maintenance HD schedule MWF   Last dialyzed yesterday, prior to arrival to HD.  Electrolytes within normal limits, Clear CXR.  HD consent obtained from pt. Plan for HD tomorrow.   Await ECHO. Telemetry monitoring.

## 2020-11-05 NOTE — CHART NOTE - NSCHARTNOTEFT_GEN_A_CORE
House nephrology consult canceled  Pt follows up with Dr Hahn as outpt  Would like Dr Rogers's group for HD   Dr Rogers informed with confirmation    Willow Castle NP  Pager [phone]

## 2020-11-06 ENCOUNTER — TRANSCRIPTION ENCOUNTER (OUTPATIENT)
Age: 80
End: 2020-11-06

## 2020-11-06 VITALS
TEMPERATURE: 99 F | RESPIRATION RATE: 16 BRPM | HEART RATE: 95 BPM | OXYGEN SATURATION: 100 % | DIASTOLIC BLOOD PRESSURE: 82 MMHG | SYSTOLIC BLOOD PRESSURE: 121 MMHG

## 2020-11-06 DIAGNOSIS — Z02.9 ENCOUNTER FOR ADMINISTRATIVE EXAMINATIONS, UNSPECIFIED: ICD-10-CM

## 2020-11-06 LAB
ALBUMIN SERPL ELPH-MCNC: 3.6 G/DL — SIGNIFICANT CHANGE UP (ref 3.3–5)
ALP SERPL-CCNC: 62 U/L — SIGNIFICANT CHANGE UP (ref 40–120)
ALT FLD-CCNC: 13 U/L — SIGNIFICANT CHANGE UP (ref 4–33)
ANION GAP SERPL CALC-SCNC: 20 MMO/L — HIGH (ref 7–14)
AST SERPL-CCNC: 31 U/L — SIGNIFICANT CHANGE UP (ref 4–32)
BILIRUB SERPL-MCNC: 0.2 MG/DL — SIGNIFICANT CHANGE UP (ref 0.2–1.2)
BUN SERPL-MCNC: 65 MG/DL — HIGH (ref 7–23)
CALCIUM SERPL-MCNC: 8.8 MG/DL — SIGNIFICANT CHANGE UP (ref 8.4–10.5)
CHLORIDE SERPL-SCNC: 93 MMOL/L — LOW (ref 98–107)
CO2 SERPL-SCNC: 23 MMOL/L — SIGNIFICANT CHANGE UP (ref 22–31)
CREAT SERPL-MCNC: 8.73 MG/DL — HIGH (ref 0.5–1.3)
GLUCOSE SERPL-MCNC: 91 MG/DL — SIGNIFICANT CHANGE UP (ref 70–99)
HBV CORE AB SER-ACNC: NONREACTIVE — SIGNIFICANT CHANGE UP
HBV SURFACE AB SER-ACNC: 248.3 MLU/ML — SIGNIFICANT CHANGE UP
HBV SURFACE AG SER-ACNC: NEGATIVE — SIGNIFICANT CHANGE UP
HCT VFR BLD CALC: 36.6 % — SIGNIFICANT CHANGE UP (ref 34.5–45)
HCV AB S/CO SERPL IA: 0.09 S/CO — SIGNIFICANT CHANGE UP (ref 0–0.99)
HCV AB SERPL-IMP: SIGNIFICANT CHANGE UP
HGB BLD-MCNC: 11.4 G/DL — LOW (ref 11.5–15.5)
MAGNESIUM SERPL-MCNC: 2.6 MG/DL — SIGNIFICANT CHANGE UP (ref 1.6–2.6)
MCHC RBC-ENTMCNC: 30.2 PG — SIGNIFICANT CHANGE UP (ref 27–34)
MCHC RBC-ENTMCNC: 31.1 % — LOW (ref 32–36)
MCV RBC AUTO: 96.8 FL — SIGNIFICANT CHANGE UP (ref 80–100)
NRBC # FLD: 0 K/UL — SIGNIFICANT CHANGE UP (ref 0–0)
PHOSPHATE SERPL-MCNC: 5.9 MG/DL — HIGH (ref 2.5–4.5)
PLATELET # BLD AUTO: 190 K/UL — SIGNIFICANT CHANGE UP (ref 150–400)
PMV BLD: 10.8 FL — SIGNIFICANT CHANGE UP (ref 7–13)
POTASSIUM SERPL-MCNC: 3.5 MMOL/L — SIGNIFICANT CHANGE UP (ref 3.5–5.3)
POTASSIUM SERPL-SCNC: 3.5 MMOL/L — SIGNIFICANT CHANGE UP (ref 3.5–5.3)
PROT SERPL-MCNC: 7.4 G/DL — SIGNIFICANT CHANGE UP (ref 6–8.3)
RBC # BLD: 3.78 M/UL — LOW (ref 3.8–5.2)
RBC # FLD: 14.6 % — HIGH (ref 10.3–14.5)
SODIUM SERPL-SCNC: 136 MMOL/L — SIGNIFICANT CHANGE UP (ref 135–145)
WBC # BLD: 9.22 K/UL — SIGNIFICANT CHANGE UP (ref 3.8–10.5)
WBC # FLD AUTO: 9.22 K/UL — SIGNIFICANT CHANGE UP (ref 3.8–10.5)

## 2020-11-06 PROCEDURE — 99239 HOSP IP/OBS DSCHRG MGMT >30: CPT

## 2020-11-06 RX ORDER — ACETAMINOPHEN 500 MG
650 TABLET ORAL EVERY 6 HOURS
Refills: 0 | Status: DISCONTINUED | OUTPATIENT
Start: 2020-11-06 | End: 2020-11-06

## 2020-11-06 RX ORDER — FLUDROCORTISONE ACETATE 0.1 MG/1
0.1 TABLET ORAL DAILY
Refills: 0 | Status: DISCONTINUED | OUTPATIENT
Start: 2020-11-06 | End: 2020-11-06

## 2020-11-06 RX ORDER — ATORVASTATIN CALCIUM 80 MG/1
1 TABLET, FILM COATED ORAL
Qty: 0 | Refills: 0 | DISCHARGE

## 2020-11-06 RX ORDER — FOLIC ACID 0.8 MG
1 TABLET ORAL
Qty: 0 | Refills: 0 | DISCHARGE

## 2020-11-06 RX ORDER — FLUDROCORTISONE ACETATE 0.1 MG/1
1 TABLET ORAL
Qty: 30 | Refills: 0
Start: 2020-11-06 | End: 2020-12-05

## 2020-11-06 RX ORDER — AMLODIPINE BESYLATE 2.5 MG/1
1 TABLET ORAL
Qty: 0 | Refills: 0 | DISCHARGE

## 2020-11-06 RX ADMIN — GABAPENTIN 100 MILLIGRAM(S): 400 CAPSULE ORAL at 14:11

## 2020-11-06 RX ADMIN — Medication 81 MILLIGRAM(S): at 11:42

## 2020-11-06 RX ADMIN — Medication 1 MILLIGRAM(S): at 11:34

## 2020-11-06 RX ADMIN — SEVELAMER CARBONATE 1600 MILLIGRAM(S): 2400 POWDER, FOR SUSPENSION ORAL at 17:48

## 2020-11-06 RX ADMIN — Medication 650 MILLIGRAM(S): at 12:18

## 2020-11-06 RX ADMIN — GABAPENTIN 100 MILLIGRAM(S): 400 CAPSULE ORAL at 05:19

## 2020-11-06 RX ADMIN — HEPARIN SODIUM 5000 UNIT(S): 5000 INJECTION INTRAVENOUS; SUBCUTANEOUS at 05:19

## 2020-11-06 RX ADMIN — Medication 650 MILLIGRAM(S): at 11:33

## 2020-11-06 RX ADMIN — CLOPIDOGREL BISULFATE 75 MILLIGRAM(S): 75 TABLET, FILM COATED ORAL at 11:34

## 2020-11-06 RX ADMIN — SEVELAMER CARBONATE 1600 MILLIGRAM(S): 2400 POWDER, FOR SUSPENSION ORAL at 11:35

## 2020-11-06 RX ADMIN — SEVELAMER CARBONATE 1600 MILLIGRAM(S): 2400 POWDER, FOR SUSPENSION ORAL at 10:47

## 2020-11-06 RX ADMIN — Medication 1 TABLET(S): at 11:34

## 2020-11-06 NOTE — PROGRESS NOTE ADULT - ATTENDING COMMENTS
Neno Rogers MD  [facility]  Office [phone]  Ans Serv [phone] cell - [phone]

## 2020-11-06 NOTE — PROVIDER CONTACT NOTE (OTHER) - SITUATION
patient blood pressure is low after 5 minutes into dialysis , and the arterial  pressure is elevated when the Blood flow rate is increased

## 2020-11-06 NOTE — PROGRESS NOTE ADULT - PROBLEM SELECTOR PLAN 1
no significant tele events   orthostatic hypotension - start florinef and monitor   fall precautions   negative delta trop  TTE reviewed

## 2020-11-06 NOTE — PROGRESS NOTE ADULT - PROBLEM SELECTOR PLAN 2
s/p HD on Wed, next due for HD on Friday - renal on board  c/w sevelamer pre-meals  c/w DAPT given history of AVF thrombosis

## 2020-11-06 NOTE — PROGRESS NOTE ADULT - PROBLEM SELECTOR PLAN 1
Maintenance HD schedule MWF   Pt with intradialytic hypotension, flowsheet reviewed.  Low UF achieved. Next HD 11/9.  Electrolytes within normal limits, Clear CXR.  Started on Florinef for +orthostatics.

## 2020-11-06 NOTE — PROGRESS NOTE ADULT - PROBLEM SELECTOR PLAN 4
Transitions of Care Status:  1.  Name of PCP: Dr. Jones   2.  PCP Contacted on Admission: [ ] Y    [X ] N    3.  PCP contacted at Discharge: [X] Y    [ ] N    [ ] N/A  4.  Post-Discharge Appointment Date and Location: Pt to set up appt with PCP within 1 week   5.  Summary of Handoff given to PCP:  yes.

## 2020-11-06 NOTE — DISCHARGE NOTE PROVIDER - CARE PROVIDER_API CALL
[facility]  INTERNAL MEDICINE  [address] [place] [address]  Phone: [phone]  Fax: [phone]  Follow Up Time:     Dr Jones,   YOANA  Phone: (   )    -  Fax: (   )    -  Follow Up Time:

## 2020-11-06 NOTE — PROGRESS NOTE ADULT - SUBJECTIVE AND OBJECTIVE BOX
[facility] Division of Hospital Medicine  Dona Shelton MD  Pager (M-F, 8A-5P): [phone]  Other Times:  [phone]    Patient is a 80y old  Female who presents with a chief complaint of syncope (05 Nov 2020 13:41)    SUBJECTIVE / OVERNIGHT EVENTS:  Pt feels well - denies complaints.     MEDICATIONS  (STANDING):  aspirin enteric coated 81 milliGRAM(s) Oral daily  atorvastatin 40 milliGRAM(s) Oral at bedtime  clopidogrel Tablet 75 milliGRAM(s) Oral daily  folic acid 1 milliGRAM(s) Oral daily  gabapentin 100 milliGRAM(s) Oral three times a day  heparin   Injectable 5000 Unit(s) SubCutaneous every 12 hours  Nephro-katja 1 Tablet(s) Oral daily  sevelamer carbonate 1600 milliGRAM(s) Oral three times a day with meals    MEDICATIONS  (PRN):  acetaminophen   Tablet .. 650 milliGRAM(s) Oral every 6 hours PRN Mild Pain (1 - 3), Moderate Pain (4 - 6)  cyclobenzaprine 10 milliGRAM(s) Oral <User Schedule> PRN Muscle Spasm      CAPILLARY BLOOD GLUCOSE        I&O's Summary    06 Nov 2020 07:01  -  06 Nov 2020 12:19  --------------------------------------------------------  IN: 800 mL / OUT: 575 mL / NET: 225 mL        PHYSICAL EXAM:  Vital Signs Last 24 Hrs  T(C): 36.3 (06 Nov 2020 10:27), Max: 37.2 (05 Nov 2020 21:20)  T(F): 97.4 (06 Nov 2020 10:27), Max: 98.9 (05 Nov 2020 21:20)  HR: 67 (06 Nov 2020 09:57) (55 - 80)  BP: 114/63 (06 Nov 2020 09:57) (101/51 - 114/63)  BP(mean): 64 (05 Nov 2020 13:20) (64 - 64)  RR: 16 (06 Nov 2020 10:27) (16 - 18)  SpO2: 93% (06 Nov 2020 10:27) (93% - 98%)    CONSTITUTIONAL: NAD, well-developed, well-groomed  EYES: EOMI; conjunctiva and sclera clear  ENMT: Moist oral mucosa  RESPIRATORY: Normal respiratory effort; lungs are clear to auscultation bilaterally  CARDIOVASCULAR: Regular rate and rhythm, normal S1 and S2, no murmur; No lower extremity edema  ABDOMEN: Nontender to palpation, normoactive bowel sounds, no rebound/guarding  MUSCULOSKELETAL:   no clubbing or cyanosis of digits; no joint swelling or tenderness to palpation  PSYCH: A+O to person, place, and time; affect appropriate  NEUROLOGY: CN 2-12 are intact and symmetric; no gross sensory deficits   SKIN: No rashes; no palpable lesions    LABS:                        11.4   9.22  )-----------( 190      ( 06 Nov 2020 07:00 )             36.6     11-06    136  |  93<L>  |  65<H>  ----------------------------<  91  3.5   |  23  |  8.73<H>    Ca    8.8      06 Nov 2020 07:00  Phos  5.9     11-06  Mg     2.6     11-06    TPro  7.4  /  Alb  3.6  /  TBili  0.2  /  DBili  x   /  AST  31  /  ALT  13  /  AlkPhos  62  11-06    PT/INR - ( 05 Nov 2020 06:00 )   PT: 11.3 SEC;   INR: 0.99            CARDIAC MARKERS ( 05 Nov 2020 06:00 )  x     / x     / 195 u/L / 4.42 ng/mL / x              RADIOLOGY & ADDITIONAL TESTS:  Results Reviewed:   Imaging Personally Reviewed:  Electrocardiogram Personally Reviewed:    COORDINATION OF CARE:  Care Discussed with Consultants/Other Providers [Y/N]: Y  Prior or Outpatient Records Reviewed [Y/N]: Y

## 2020-11-06 NOTE — DISCHARGE NOTE PROVIDER - NSDCFUSCHEDAPPT_GEN_ALL_CORE_FT
URSULA MÉNDEZ ; 01/12/2021 ; [facility] [address] ; 01/12/2021 ; [facility] [address]

## 2020-11-06 NOTE — DISCHARGE NOTE NURSING/CASE MANAGEMENT/SOCIAL WORK - PATIENT PORTAL LINK FT
You can access the FollowMyHealth Patient Portal offered by [facility] by registering at the following website: [email or web address]. By joining [facility]’s FollowMyHealth portal, you will also be able to view your health information using other applications (apps) compatible with our system.

## 2020-11-06 NOTE — DISCHARGE NOTE PROVIDER - NSDCMRMEDTOKEN_GEN_ALL_CORE_FT
amLODIPine 5 mg oral tablet: 1 tab(s) orally once a day  Aspirin Enteric Coated 81 mg oral delayed release tablet: 1 tab(s) orally once a day  atorvastatin 20 mg oral tablet: 1 tab(s) orally once a day  atorvastatin 40 mg oral tablet: 1 tab(s) orally once a day  B Complex 50: 1 tab(s) orally once a day  cyclobenzaprine 10 mg oral tablet: 1 tab(s) orally 3 times a week before HD  ferrous sulfate: 1 tab(s) orally 3 times a day  folic acid 1 mg oral tablet: 1 tab(s) orally once a day  folic acid 1 mg oral tablet: 1 tab(s) orally once a day  gabapentin 100 mg oral tablet: 1 tab(s) orally 3 times a day  oxyCODONE-acetaminophen 5 mg-325 mg oral tablet: 1 tab(s) orally every 6 hours, As needed, Severe Pain (7 - 10) MDD:4 tabs  Plavix 75 mg oral tablet: 1 tab(s) orally once a day  Paige-Rose oral tablet: 1 tab(s) orally once a day  Renvela 800 mg oral tablet: 2 tab(s) orally 3 times a day (with meals)   Aspirin Enteric Coated 81 mg oral delayed release tablet: 1 tab(s) orally once a day  atorvastatin 40 mg oral tablet: 1 tab(s) orally once a day  B Complex 50: 1 tab(s) orally once a day  cyclobenzaprine 10 mg oral tablet: 1 tab(s) orally 3 times a week before HD  ferrous sulfate: 1 tab(s) orally 3 times a day  fludrocortisone 0.1 mg oral tablet: 1 tab(s) orally once a day  folic acid 1 mg oral tablet: 1 tab(s) orally once a day  gabapentin 100 mg oral tablet: 1 tab(s) orally 3 times a day  oxyCODONE-acetaminophen 5 mg-325 mg oral tablet: 1 tab(s) orally every 6 hours, As needed, Severe Pain (7 - 10) MDD:4 tabs  Plavix 75 mg oral tablet: 1 tab(s) orally once a day  Paige-Rose oral tablet: 1 tab(s) orally once a day  Renvela 800 mg oral tablet: 2 tab(s) orally 3 times a day (with meals)

## 2020-11-06 NOTE — PROGRESS NOTE ADULT - SUBJECTIVE AND OBJECTIVE BOX
Nephrology Followup Note - [phone] - Dr Rogers / Dr Mccullough / Dr Hinson / Dr Bolaños / Dr Helms / Dr Rojas / Dr Hahn / Dr Han  Pt seen and examined at bedside  No acute events overnight. No complaints.     Allergies:  No Known Allergies    Hospital Medications:   MEDICATIONS  (STANDING):  aspirin enteric coated 81 milliGRAM(s) Oral daily  atorvastatin 40 milliGRAM(s) Oral at bedtime  clopidogrel Tablet 75 milliGRAM(s) Oral daily  fludroCORTISONE 0.1 milliGRAM(s) Oral daily  folic acid 1 milliGRAM(s) Oral daily  gabapentin 100 milliGRAM(s) Oral three times a day  heparin   Injectable 5000 Unit(s) SubCutaneous every 12 hours  Nephro-katja 1 Tablet(s) Oral daily  sevelamer carbonate 1600 milliGRAM(s) Oral three times a day with meals      VITALS:  T(F): 97.4 (11-06-20 @ 10:27), Max: 98.9 (11-05-20 @ 21:20)  HR: 67 (11-06-20 @ 09:57)  BP: 114/63 (11-06-20 @ 09:57)  RR: 16 (11-06-20 @ 10:27)  SpO2: 93% (11-06-20 @ 10:27)  Wt(kg): --    11-06 @ 07:01  -  11-06 @ 16:10  --------------------------------------------------------  IN: 800 mL / OUT: 575 mL / NET: 225 mL        PHYSICAL EXAM:  Constitutional: NAD  HEENT: anicteric sclera, oropharynx clear, MMM  Neck: No JVD  Respiratory: CTAB, no wheezes, rales or rhonchi  Cardiovascular: S1, S2, RRR  Gastrointestinal: BS+, soft, NT/ND  Extremities: No cyanosis or clubbing. No peripheral edema  Neurological: A/O x 3, no focal deficits  Psychiatric: Normal mood, normal affect  : No CVA tenderness. No iyer.   Skin: No rashes  Vascular Access: LUE AV access +thrill and bruit.     LABS:  11-06    136  |  93<L>  |  65<H>  ----------------------------<  91  3.5   |  23  |  8.73<H>    Ca    8.8      06 Nov 2020 07:00  Phos  5.9     11-06  Mg     2.6     11-06    TPro  7.4  /  Alb  3.6  /  TBili  0.2  /  DBili      /  AST  31  /  ALT  13  /  AlkPhos  62  11-06    Creatinine Trend: 8.73 <--, 6.48 <--, 5.55 <--                        11.4   9.22  )-----------( 190      ( 06 Nov 2020 07:00 )             36.6     Urine Studies:      RADIOLOGY & ADDITIONAL STUDIES:

## 2020-11-06 NOTE — DISCHARGE NOTE PROVIDER - PROVIDER TOKENS
PROVIDER:[TOKEN:[[id number]]],FREE:[LAST:[Dr Jones],PHONE:[(   )    -],FAX:[(   )    -],ADDRESS:[PMD]]

## 2020-11-06 NOTE — DISCHARGE NOTE PROVIDER - NSDCCPCAREPLAN_GEN_ALL_CORE_FT
PRINCIPAL DISCHARGE DIAGNOSIS  Diagnosis: Syncope and collapse  Assessment and Plan of Treatment: syncope likely due to fluctuating blood pressure during position change. please avoid standing up too fast, sit at bedside for a min before standing. followup with PMD in 1-2 weeks. you were started on florinef for orthostatic      SECONDARY DISCHARGE DIAGNOSES  Diagnosis: ESRD on dialysis  Assessment and Plan of Treatment: Please follow up with your nephrologist for management, and continue your schedule hemodialysis.

## 2020-11-06 NOTE — DISCHARGE NOTE PROVIDER - HOSPITAL COURSE
80F w/with chronic interstitial nephritis/ESRD on HD M/W/F (last HD On 11/04/2020), presenting from HD for recent syncopal episode      Hospital course:    Pt admitted with syncopal episode. CTH with no acute pathology, chronic CVA. CE elevated but no delta change. Elevated trop likely due to ESRD. Pt with + orthostatic, TTE EF 25, severe LV dysfunction, distal myocardium/apex hypokinetic. Syncope likely due to orthostatic. Pt started on florinef for orthostatic. Renal following for continue HD. PT recommended home PT    Case discussed with ***. Reviewed discharge medications with patient; All new medications requiring new prescription sent to pharmacy of patients choice. Reviewed need for prescription for previous home medication and new prescriptions sent if requested. Patient in agreement and understands.        80F w/with chronic interstitial nephritis/ESRD on HD M/W/F (last HD On 11/04/2020), presenting from HD for recent syncopal episode      Hospital course:    Pt admitted with syncopal episode. CTH with no acute pathology, chronic CVA. CE elevated but no delta change. Elevated trop likely due to ESRD. Pt with + orthostatic, TTE EF 25, severe LV dysfunction, distal myocardium/apex hypokinetic. Syncope likely due to orthostatic. Pt started on florinef for orthostatic. Renal following for continue HD. PT recommended home PT    Case discussed with Cat on 11/6 pt medically optimized for discharge. Reviewed discharge medications with patient; All new medications requiring new prescription sent to pharmacy of patients choice. Reviewed need for prescription for previous home medication and new prescriptions sent if requested. Patient in agreement and understands.

## 2020-11-06 NOTE — PROGRESS NOTE ADULT - ATTENDING COMMENTS
home PT - d/c planning if pt tolerates florinef. home PT - discharge planning and coordination ~ 45mins.

## 2020-11-27 RX ORDER — CLOPIDOGREL BISULFATE 75 MG/1
75 TABLET, FILM COATED ORAL DAILY
Qty: 90 | Refills: 3 | Status: ACTIVE | COMMUNITY
Start: 2019-05-21 | End: 1900-01-01

## 2021-01-12 ENCOUNTER — APPOINTMENT (OUTPATIENT)
Dept: VASCULAR SURGERY | Facility: CLINIC | Age: 81
End: 2021-01-12
Payer: MEDICARE

## 2021-01-12 PROBLEM — Z86.79 PERSONAL HISTORY OF OTHER DISEASES OF THE CIRCULATORY SYSTEM: Chronic | Status: ACTIVE | Noted: 2020-11-05

## 2021-01-12 PROBLEM — N18.6 END STAGE RENAL DISEASE: Chronic | Status: ACTIVE | Noted: 2020-11-05

## 2021-01-12 PROBLEM — N11.9: Chronic | Status: ACTIVE | Noted: 2020-11-05

## 2021-01-12 PROCEDURE — 99213 OFFICE O/P EST LOW 20 MIN: CPT

## 2021-01-12 PROCEDURE — 93990 DOPPLER FLOW TESTING: CPT

## 2021-01-12 PROCEDURE — 99072 ADDL SUPL MATRL&STAF TM PHE: CPT

## 2021-01-12 NOTE — PHYSICAL EXAM
[Thrill] : thrill [Pulsatile Thrill] : no pulsatile thrill [Aneurysm] : no aneurysm [Bleeding] : no bleeding [Hand well perfused] : hand well perfused [Ulcer] : no ulcer [2+] : left 2+ [Normal] : coordination grossly intact, no focal deficits [de-identified] : intact

## 2021-01-12 NOTE — DISCUSSION/SUMMARY
[FreeTextEntry1] : 81 yo female with history of esrd on hd presents for followup of left upper extremity avf with multiple episode of clotting while on hd \par \par duplex shows with a 50-75% in stent stenosis of the proximal upper arm with flow rate of 1094\par no need for fistulogram\par f/u 3 months

## 2021-01-12 NOTE — HISTORY OF PRESENT ILLNESS
[FreeTextEntry1] : 79 yo female with history of esrd on hd presents for followup of left upper extremity avf.  pt reports a couple of episodes of clotting while on hd with the last occurrence was yesterday.   [] : left basilic fistula

## 2021-04-15 NOTE — REASON FOR VISIT
Is This A New Presentation, Or A Follow-Up?: Rash [Inadequate Flow within AVF] : inadequate flow within AVF [Other ___] : a [unfilled] visit for

## 2021-04-27 ENCOUNTER — APPOINTMENT (OUTPATIENT)
Dept: VASCULAR SURGERY | Facility: CLINIC | Age: 81
End: 2021-04-27
Payer: MEDICARE

## 2021-04-27 VITALS
SYSTOLIC BLOOD PRESSURE: 135 MMHG | BODY MASS INDEX: 22.97 KG/M2 | WEIGHT: 117 LBS | HEIGHT: 60 IN | DIASTOLIC BLOOD PRESSURE: 77 MMHG | HEART RATE: 76 BPM

## 2021-04-27 PROCEDURE — 99072 ADDL SUPL MATRL&STAF TM PHE: CPT

## 2021-04-27 PROCEDURE — 99213 OFFICE O/P EST LOW 20 MIN: CPT

## 2021-04-27 PROCEDURE — 93990 DOPPLER FLOW TESTING: CPT

## 2021-04-27 NOTE — DISCUSSION/SUMMARY
[FreeTextEntry1] : 82 yo female with history of esrd on hd presents for followup of left upper extremity avf.\par \par duplex shows 20-50% stenosis at the juxta anastomosis with 75% stenosis at the mid upper arm with flow rate of 1377\par \par will arrange for fistulogram of the left upper extremity given severe stenosis and prolonged bleeding

## 2021-04-27 NOTE — HISTORY OF PRESENT ILLNESS
[] : left brachiocephalic fistula [FreeTextEntry1] : 82 yo female with history of esrd on hd presents for followup of left upper extremity avf.  pt reports prolonged bleeding after hd.  pt states that the bleeding is even noted 24 hours after hd.  \par pt also reports occasional cramping of the lower extremities

## 2021-04-27 NOTE — PHYSICAL EXAM
[Normal] : no acute distress, well-nourished, well developed, well appearing [Thrill] : thrill [Pulsatile Thrill] : pulsatile thrill [Aneurysm] : aneurysm [Bleeding] : bleeding [Hand well perfused] : hand well perfused [2+] : left 2+ [Ulcer] : no ulcer [de-identified] : intact

## 2021-05-09 ENCOUNTER — APPOINTMENT (OUTPATIENT)
Dept: DISASTER EMERGENCY | Facility: CLINIC | Age: 81
End: 2021-05-09

## 2021-05-10 LAB — SARS-COV-2 N GENE NPH QL NAA+PROBE: NOT DETECTED

## 2021-05-13 ENCOUNTER — APPOINTMENT (OUTPATIENT)
Dept: ENDOVASCULAR SURGERY | Facility: CLINIC | Age: 81
End: 2021-05-13
Payer: MEDICARE

## 2021-05-13 ENCOUNTER — RESULT REVIEW (OUTPATIENT)
Age: 81
End: 2021-05-13

## 2021-05-13 VITALS
DIASTOLIC BLOOD PRESSURE: 72 MMHG | TEMPERATURE: 98.2 F | WEIGHT: 117 LBS | HEIGHT: 60 IN | OXYGEN SATURATION: 100 % | BODY MASS INDEX: 22.97 KG/M2 | RESPIRATION RATE: 16 BRPM | HEART RATE: 84 BPM | SYSTOLIC BLOOD PRESSURE: 150 MMHG

## 2021-05-13 PROCEDURE — 36902Z: CUSTOM

## 2021-05-13 PROCEDURE — 99072 ADDL SUPL MATRL&STAF TM PHE: CPT

## 2021-05-19 NOTE — PROCEDURE
[Resume diet] : resume diet [Site check for bleeding/hematoma/thrill/bruit] : Site check for bleeding/hematoma/thrill/bruit [Vital signs on admission the q 15 mins x2] : Vital signs on admission the q 15 mins x2 [FreeTextEntry1] : left arm fistula/fistulogram/angioplasty [FreeTextEntry3] : stenosis on duplex, plan for fistulogram and angioplasty

## 2021-05-19 NOTE — HISTORY OF PRESENT ILLNESS
[] : left basilic fistula [FreeTextEntry1] : 11/30/2017 Dr. Klein [FreeTextEntry4] : yesterday [FreeTextEntry5] : 6am today [FreeTextEntry6] : Dr. Law

## 2021-08-05 ENCOUNTER — APPOINTMENT (OUTPATIENT)
Dept: VASCULAR SURGERY | Facility: CLINIC | Age: 81
End: 2021-08-05
Payer: MEDICARE

## 2021-08-05 PROCEDURE — 99213 OFFICE O/P EST LOW 20 MIN: CPT

## 2021-08-05 PROCEDURE — 93990 DOPPLER FLOW TESTING: CPT

## 2021-08-05 NOTE — DISCUSSION/SUMMARY
[FreeTextEntry1] : 82 yo female with history of esrd on hd presents for followup of left upper extremity avf.\par \par duplex shows 50-75%  stenosis at the juxta anastomosis with 50-75% stenosis at the mid upper arm with a new PSA   flow rate of 1653\par \par will arrange for fistulogram of the left upper extremity given severe stenosis and PSA

## 2021-08-05 NOTE — HISTORY OF PRESENT ILLNESS
[FreeTextEntry1] : 80 yo female with history of esrd on hd presents for followup of left upper extremity avf.  pt reports recent large infiltration [] : left brachiocephalic fistula

## 2021-08-05 NOTE — PHYSICAL EXAM
[Normal] : no acute distress, well-nourished, well developed, well appearing [Thrill] : thrill [Pulsatile Thrill] : pulsatile thrill [Aneurysm] : aneurysm [Bleeding] : bleeding [Hand well perfused] : hand well perfused [Ulcer] : no ulcer [2+] : left 2+ [de-identified] : intact show

## 2021-08-07 ENCOUNTER — APPOINTMENT (OUTPATIENT)
Dept: DISASTER EMERGENCY | Facility: CLINIC | Age: 81
End: 2021-08-07

## 2021-08-07 DIAGNOSIS — Z01.818 ENCOUNTER FOR OTHER PREPROCEDURAL EXAMINATION: ICD-10-CM

## 2021-08-08 LAB — SARS-COV-2 N GENE NPH QL NAA+PROBE: NOT DETECTED

## 2021-08-12 ENCOUNTER — APPOINTMENT (OUTPATIENT)
Dept: ENDOVASCULAR SURGERY | Facility: CLINIC | Age: 81
End: 2021-08-12
Payer: MEDICARE

## 2021-08-12 ENCOUNTER — RESULT REVIEW (OUTPATIENT)
Age: 81
End: 2021-08-12

## 2021-08-12 ENCOUNTER — NON-APPOINTMENT (OUTPATIENT)
Age: 81
End: 2021-08-12

## 2021-08-12 VITALS
HEART RATE: 78 BPM | TEMPERATURE: 98 F | WEIGHT: 117 LBS | DIASTOLIC BLOOD PRESSURE: 74 MMHG | BODY MASS INDEX: 22.97 KG/M2 | OXYGEN SATURATION: 95 % | RESPIRATION RATE: 16 BRPM | SYSTOLIC BLOOD PRESSURE: 147 MMHG | HEIGHT: 60 IN

## 2021-08-12 PROCEDURE — 36902Z: CUSTOM

## 2021-08-12 RX ORDER — FOLIC ACID 1 MG/1
1 TABLET ORAL
Refills: 0 | Status: DISCONTINUED | COMMUNITY
End: 2021-08-12

## 2021-08-12 RX ORDER — CYCLOBENZAPRINE HYDROCHLORIDE 10 MG/1
10 TABLET, FILM COATED ORAL
Refills: 0 | Status: ACTIVE | COMMUNITY

## 2021-08-12 RX ORDER — FOLIC ACID/VIT B COMPLEX AND C 0.8 MG
TABLET ORAL
Refills: 0 | Status: ACTIVE | COMMUNITY

## 2021-08-12 NOTE — REASON FOR VISIT
[Other ___] : a [unfilled] visit for [Prolonged Bleeding] : prolonged bleeding [Other: ___] : [unfilled] [Infiltration] : infiltration

## 2021-08-12 NOTE — PHYSICAL EXAM
[Pulsatile Thrill] : pulsatile thrill [FreeTextEntry1] : resolving infiltration over fistula s/p infiltration 3 weeks ago

## 2021-08-12 NOTE — ASSESSMENT
[FreeTextEntry1] : duplex shows 50-75%  stenosis at the juxta anastomosis with 50-75% stenosis at the mid upper arm with a new PSA   flow rate of 1653\par \par will plan for fistulogram of the left upper extremity given severe stenosis and PSA

## 2021-09-14 NOTE — H&P PST ADULT - FAMILY HISTORY
Writer reviewed chart. In ED yesterday, muscle sprain to left leg and developed hematoma, vitamin k 5mg given and per Dr. Izaguirre patient should be off warfarin for one week. Patient called and she validated the above. She says she has bruising all the way down her leg. She is holding her warfarin and she is seeing Dr. Izaguirre on 9/20/21 and will call [facility] office with his directives for resuming Warfarin. Patient verbalizes understanding and ok with plan.  All upcoming [facility] appointments cancelled as we will see what next week brings and make f/u at that time.      No pertinent family history in first degree relatives

## 2021-09-22 ENCOUNTER — RX RENEWAL (OUTPATIENT)
Age: 81
End: 2021-09-22

## 2021-11-15 NOTE — PHYSICAL THERAPY INITIAL EVALUATION ADULT - IMPAIRED TRANSFERS: SIT/STAND, REHAB EVAL
November 18, 2021      Annie Davepe  [address] [facility] [address]        Dear Ms.Tillner Garcia,    We are writing to inform you of your test results.    Needs Hep B Vaccines Series.     Resulted Orders   Hepatitis C antibody   Result Value Ref Range    Hepatitis C Antibody Nonreactive Nonreactive    Narrative    Assay performance characteristics have not been established for newborns, infants, and children.   HIV Antigen Antibody Combo   Result Value Ref Range    HIV Antigen Antibody Combo Nonreactive Nonreactive      Comment:      HIV-1 p24 Ag & HIV-1/HIV-2 Ab Not Detected   Hepatitis B Surface Antibody   Result Value Ref Range    Hepatitis B Surface Antibody 4.48 <8.00 m[IU]/mL      Comment:      Nonreactive, No antibody detected when the value is less than 8.00 mIU/mL.   Rubella Antibody IgG   Result Value Ref Range    Rubella Janet IgG Instrument Value 1.47 (H) <0.90 Index    Rubella Antibody IgG Positive (A) Negative      Comment:      Suggests previous exposure or immunization and probable immunity.   Rubeola Antibody IgG   Result Value Ref Range    Rubeola (Measles) Janet IgG Instrument Value 83.7 (H) <13.5 AU/mL    Rubeola (Measles) Antibody IgG Positive (A) No detectable antibody.      Comment:      Suggests previous exposure or immunization and probable immunity.   Mumps Immune Status, IgG   Result Value Ref Range    Mumps Janet IgG Instrument Value 113.0 (H) <9.0 AU/mL    Mumps Antibody IgG Positive (A) Negative, suggests no immunologic exposure.      Comment:      Suggests previous exposure or immunization and probable immunity.   Varicella Zoster Virus Antibody IgG   Result Value Ref Range    VZV Janet IgG Instrument Value 316.9 (H) <135.0 Index    Varicella Zoster Antibody IgG Positive (A) No detectable antibody.       Comment:      Suggests previous exposure or immunization and probable immunity.   Hepatitis B surface antigen   Result Value Ref Range    Hepatitis B Surface Antigen  Nonreactive Nonreactive   Quantiferon TB Gold Plus Grey Tube   Result Value Ref Range    Quantiferon Nil Tube 0.05 IU/mL   Quantiferon TB Gold Plus Green Tube   Result Value Ref Range    Quantiferon TB1 Tube 0.11 IU/mL   Quantiferon TB Gold Plus Yellow Tube   Result Value Ref Range    Quantiferon TB2 Tube 0.07    Quantiferon TB Gold Plus Purple Tube   Result Value Ref Range    Quantiferon Mitogen 10.00 IU/mL   Quantiferon TB Gold Plus   Result Value Ref Range    Quantiferon-TB Gold Plus Negative Negative      Comment:      No interferon gamma response to M.tuberculosis antigens was detected. Infection with M.tuberculosis is unlikely, however a single negative result does not exclude infection. In patients at high risk for infection, a second test should be considered in accordance with the 2017 ATS/IDSA/CDC Clinical Pract  ice Guidelines for Diagnosis of Tuberculosis in Adults and Children     TB1 Ag minus Nil Value 0.06 IU/mL    TB2 Ag minus Nil Value 0.02 IU/mL    Mitogen minus Nil Result 9.95 IU/mL    Nil Result 0.05 IU/mL       If you have any questions or concerns, please call the clinic at the number listed above.       Sincerely,      Petar Salgado, DO           decreased strength/impaired balance

## 2021-11-16 ENCOUNTER — APPOINTMENT (OUTPATIENT)
Dept: VASCULAR SURGERY | Facility: CLINIC | Age: 81
End: 2021-11-16
Payer: MEDICARE

## 2021-11-16 VITALS
DIASTOLIC BLOOD PRESSURE: 70 MMHG | SYSTOLIC BLOOD PRESSURE: 118 MMHG | HEIGHT: 60 IN | WEIGHT: 117 LBS | BODY MASS INDEX: 22.97 KG/M2 | HEART RATE: 74 BPM

## 2021-11-16 PROCEDURE — 93990 DOPPLER FLOW TESTING: CPT

## 2021-11-16 PROCEDURE — 99213 OFFICE O/P EST LOW 20 MIN: CPT

## 2021-11-16 NOTE — DISCUSSION/SUMMARY
[FreeTextEntry1] : 82 yo female with history of esrd on hd presents for followup of left upper extremity avf.\par \par duplex shows 50%  stenosis at the juxta anastomosis with 50-75% stenosis at the mid upper arm with a new PSA   flow rate of 1165\par \par no need for intervention at this time\par f/u 3-4 months

## 2021-11-16 NOTE — HISTORY OF PRESENT ILLNESS
[FreeTextEntry1] : 82 yo female with history of esrd on hd presents for followup of left upper extremity avf.  pt reports no issues [] : left basilic fistula

## 2021-11-16 NOTE — PHYSICAL EXAM
[Normal] : no acute distress, well-nourished, well developed, well appearing [Thrill] : thrill [Pulsatile Thrill] : pulsatile thrill [Aneurysm] : aneurysm [Bleeding] : bleeding [Hand well perfused] : hand well perfused [Ulcer] : no ulcer [2+] : left 2+ [de-identified] : intact

## 2022-02-22 ENCOUNTER — APPOINTMENT (OUTPATIENT)
Dept: VASCULAR SURGERY | Facility: CLINIC | Age: 82
End: 2022-02-22
Payer: MEDICARE

## 2022-02-22 PROCEDURE — 93990 DOPPLER FLOW TESTING: CPT

## 2022-02-22 PROCEDURE — 99213 OFFICE O/P EST LOW 20 MIN: CPT

## 2022-03-08 ENCOUNTER — APPOINTMENT (OUTPATIENT)
Dept: VASCULAR SURGERY | Facility: CLINIC | Age: 82
End: 2022-03-08

## 2022-04-08 ENCOUNTER — APPOINTMENT (OUTPATIENT)
Dept: ENDOVASCULAR SURGERY | Facility: CLINIC | Age: 82
End: 2022-04-08

## 2022-05-12 ENCOUNTER — APPOINTMENT (OUTPATIENT)
Dept: ENDOVASCULAR SURGERY | Facility: CLINIC | Age: 82
End: 2022-05-12
Payer: MEDICARE

## 2022-05-12 ENCOUNTER — RESULT REVIEW (OUTPATIENT)
Age: 82
End: 2022-05-12

## 2022-05-12 VITALS
OXYGEN SATURATION: 99 % | TEMPERATURE: 97.4 F | WEIGHT: 117 LBS | BODY MASS INDEX: 22.97 KG/M2 | DIASTOLIC BLOOD PRESSURE: 63 MMHG | HEART RATE: 62 BPM | SYSTOLIC BLOOD PRESSURE: 122 MMHG | RESPIRATION RATE: 16 BRPM | HEIGHT: 60 IN

## 2022-05-12 DIAGNOSIS — I25.2 OLD MYOCARDIAL INFARCTION: ICD-10-CM

## 2022-05-12 PROCEDURE — 36902Z: CUSTOM

## 2022-05-12 RX ORDER — CALCIUM ACETATE 667 MG/1
667 CAPSULE ORAL
Refills: 0 | Status: ACTIVE | COMMUNITY

## 2022-05-12 RX ORDER — SEVELAMER CARBONATE 800 MG/1
800 TABLET, FILM COATED ORAL
Refills: 0 | Status: DISCONTINUED | COMMUNITY
End: 2022-05-12

## 2022-05-12 RX ORDER — GABAPENTIN 100 MG
100 TABLET ORAL
Refills: 0 | Status: ACTIVE | COMMUNITY

## 2022-05-12 RX ORDER — LOSARTAN POTASSIUM 50 MG/1
50 TABLET, FILM COATED ORAL
Refills: 0 | Status: ACTIVE | COMMUNITY

## 2022-05-12 NOTE — PAST MEDICAL HISTORY
[FreeTextEntry1] : Malignant Hyperthermia Screening Tool and Risk of Bleeding Assessment\par \par Ms. URSULA MÉNDEZ denies family history of unexpected death following Anesthesia or Exercise.\par Denies Family history of Malignant Hyperthermia, Muscle or Neuromuscular disorder and High Temperature following exercise.\par \par Ms. URSULA MÉNDEZ denies history of Muscle Spasm, Dark or Chocolate - Colored urine and Unanticipated fever immediately following anesthesia or serious exercise. \par Ms. MÉNDEZ also denies bleeding tendencies/ Risks of Bleeding.\par

## 2022-05-12 NOTE — ASSESSMENT
[Other: _____] : [unfilled] [FreeTextEntry1] : \par \par referred from dialysis or prolonged bleeding, will plan for fistulogram of the left upper extremity

## 2022-05-12 NOTE — HISTORY OF PRESENT ILLNESS
[FreeTextEntry1] : 11/30/2017 Dr. Klein [FreeTextEntry4] : yesterday [FreeTextEntry5] : yesterday at 9pm [FreeTextEntry6] : Dr. Staley

## 2022-05-17 ENCOUNTER — APPOINTMENT (OUTPATIENT)
Dept: VASCULAR SURGERY | Facility: CLINIC | Age: 82
End: 2022-05-17
Payer: MEDICARE

## 2022-05-17 VITALS
HEART RATE: 79 BPM | SYSTOLIC BLOOD PRESSURE: 119 MMHG | BODY MASS INDEX: 22.97 KG/M2 | HEIGHT: 60 IN | WEIGHT: 117 LBS | DIASTOLIC BLOOD PRESSURE: 64 MMHG

## 2022-05-17 PROCEDURE — 93923 UPR/LXTR ART STDY 3+ LVLS: CPT | Mod: 26

## 2022-05-17 PROCEDURE — 99213 OFFICE O/P EST LOW 20 MIN: CPT

## 2022-05-17 PROCEDURE — 93923 UPR/LXTR ART STDY 3+ LVLS: CPT

## 2022-05-17 NOTE — PHYSICAL EXAM
[JVD] : no jugular venous distention  [Normal Breath Sounds] : Normal breath sounds [Normal Rate and Rhythm] : normal rate and rhythm [2+] : left 2+ [0] : left 0 [Ankle Swelling (On Exam)] : not present [Varicose Veins Of Lower Extremities] : not present [] : not present [Abdomen Tenderness] : ~T ~M No abdominal tenderness [No Rash or Lesion] : No rash or lesion [Alert] : alert [Calm] : calm [de-identified] : Appears well

## 2022-05-17 NOTE — ASSESSMENT
[FreeTextEntry1] : In the office today patient underwent lower extremity arterial Dopplers which show mild tibial disease in the lower extremity.  No need for intervention at this time.  Patient will follow-up in 1 to 2 months with a duplex of the left arm fistula.

## 2022-05-17 NOTE — HISTORY OF PRESENT ILLNESS
[FreeTextEntry1] : 82-year-old female currently on hemodialysis via left upper arm AV fistula.  Patient states several months ago she underwent a left leg angiogram in addition to a toe amputation.  She has noted slow healing of the toe amp.\par She now presents for evaluation of the left foot.

## 2022-05-23 NOTE — HISTORY OF PRESENT ILLNESS
[] : left basilic fistula [FreeTextEntry1] : 82 yo female with history of esrd on hd presents for followup of left upper extremity avf. pt without any complaints at this time

## 2022-05-23 NOTE — PHYSICAL EXAM
[Thrill] : thrill [Pulsatile Thrill] : pulsatile thrill [Aneurysm] : aneurysm [Hand well perfused] : hand well perfused [2+] : left 2+ [Normal] : normal rate, regular rhythm, normal S1/S2, no murmur [Bleeding] : no bleeding [Ulcer] : no ulcer [de-identified] : intact

## 2022-05-23 NOTE — DISCUSSION/SUMMARY
[FreeTextEntry1] : 82 yo female with history of esrd on hd presents for followup of left upper extremity avf.\par \par duplex shows severe stenosis of the mid upper arm with psa at the distal upper arm \par \par given severe stenosis with psa and pulsatile thrill will arrange for fistulagram

## 2022-06-23 ENCOUNTER — APPOINTMENT (OUTPATIENT)
Dept: VASCULAR SURGERY | Facility: CLINIC | Age: 82
End: 2022-06-23
Payer: MEDICARE

## 2022-06-23 PROCEDURE — 93990 DOPPLER FLOW TESTING: CPT

## 2022-06-23 PROCEDURE — 99214 OFFICE O/P EST MOD 30 MIN: CPT

## 2022-06-23 NOTE — HISTORY OF PRESENT ILLNESS
[FreeTextEntry1] : 80 yo female with history of esrd on hd presents for followup of left upper extremity avf. pt reports clotting when accessing proximal aspect of the avf likely the PSA.  pt s/p left foot toe amp and angiogram in march and states that the incision is healing nicely still under the care of wound care  [] : left basilic fistula

## 2022-06-23 NOTE — PHYSICAL EXAM
[Thrill] : thrill [Pulsatile Thrill] : no pulsatile thrill [Aneurysm] : aneurysm [Bleeding] : no bleeding [Hand well perfused] : hand well perfused [Ulcer] : no ulcer [2+] : left 2+ [Normal] : coordination grossly intact, no focal deficits [de-identified] : intact, left foot toe amp site clean and small

## 2022-06-23 NOTE — H&P PST ADULT - VASCULAR
Equal and normal pulses (carotid, femoral, dorsalis pedis) Cheiloplasty (Complex) Text: A decision was made to reconstruct the defect with a  cheiloplasty.  The defect was undermined extensively.  Additional obicularis oris muscle was excised with a 15 blade scalpel.  The defect was converted into a full thickness wedge to facilite a better cosmetic result.  Small vessels were then tied off with 5-0 monocyrl. The obicularis oris, superficial fascia, adipose and dermis were then reapproximated.  After the deeper layers were approximated the epidermis was reapproximated with particular care given to realign the vermilion border.

## 2022-06-23 NOTE — DISCUSSION/SUMMARY
[FreeTextEntry1] : 82 yo female with history of esrd on hd presents for followup of left upper extremity avf.\par \par duplex shows >75% stenosis of the mid upper arm with compression of the avf by the psa with flow rate of 503\par \par left foot healing nicely would continue to monitor \par \par given severe stenosis with decrease in flow rate and difficulty with hd access during hd will plan for fistulagram of the left upper extremity

## 2022-07-09 ENCOUNTER — LABORATORY RESULT (OUTPATIENT)
Age: 82
End: 2022-07-09

## 2022-07-14 ENCOUNTER — RESULT REVIEW (OUTPATIENT)
Age: 82
End: 2022-07-14

## 2022-07-14 ENCOUNTER — APPOINTMENT (OUTPATIENT)
Dept: ENDOVASCULAR SURGERY | Facility: CLINIC | Age: 82
End: 2022-07-14

## 2022-07-14 VITALS
BODY MASS INDEX: 21.35 KG/M2 | HEART RATE: 68 BPM | SYSTOLIC BLOOD PRESSURE: 149 MMHG | HEIGHT: 62 IN | RESPIRATION RATE: 17 BRPM | DIASTOLIC BLOOD PRESSURE: 70 MMHG | TEMPERATURE: 98.3 F | WEIGHT: 116 LBS | OXYGEN SATURATION: 97 %

## 2022-07-14 PROCEDURE — 36903Z: CUSTOM

## 2022-07-14 RX ORDER — FERROUS SULFATE 325(65) MG
325 TABLET ORAL
Refills: 0 | Status: DISCONTINUED | COMMUNITY
End: 2022-07-14

## 2022-07-14 NOTE — HISTORY OF PRESENT ILLNESS
[] : left basilic fistula [FreeTextEntry1] : 11/30/2017 Dr. Klein [FreeTextEntry4] : yesterday [FreeTextEntry5] : yesterday at 9pm [FreeTextEntry6] : Dr. Law

## 2022-07-14 NOTE — REASON FOR VISIT
[Other ___] : a [unfilled] visit for [Other: ___] : [unfilled] [Formal Caregiver] : formal caregiver [FreeTextEntry2] : stenosis on duplex

## 2022-07-14 NOTE — ASSESSMENT
[Other: _____] : [unfilled] [FreeTextEntry1] : 82 yo female with history of esrd on hd via left upper extremity avf. duplex shows >75% stenosis of the mid upper arm with compression of the avf by the psa with flow rate of 503. given severe stenosis and decrease in flow rate and difficulty with hd access during hd, plan for left upper extremity fistulogram.

## 2022-10-11 ENCOUNTER — APPOINTMENT (OUTPATIENT)
Dept: VASCULAR SURGERY | Facility: CLINIC | Age: 82
End: 2022-10-11

## 2022-10-11 VITALS
HEART RATE: 63 BPM | HEIGHT: 62 IN | DIASTOLIC BLOOD PRESSURE: 73 MMHG | BODY MASS INDEX: 21.9 KG/M2 | WEIGHT: 119 LBS | SYSTOLIC BLOOD PRESSURE: 136 MMHG

## 2022-10-11 PROCEDURE — 93990 DOPPLER FLOW TESTING: CPT

## 2022-10-11 PROCEDURE — 99213 OFFICE O/P EST LOW 20 MIN: CPT

## 2022-10-11 NOTE — DISCUSSION/SUMMARY
[FreeTextEntry1] : Still82-year-old female with past medical history of end-stage renal disease on HD via left upper extremity AV fistula with in the distal upper arm\par \par Duplex shows patent AV fistula with 75% stenosis at the distal upper arm with a flow rate of 1307\par \par Given severe stenosis with pulsatile thrill and difficulty with cannulation we will arrange for fistulogram of the left upper extremity

## 2022-10-11 NOTE — HISTORY OF PRESENT ILLNESS
[FreeTextEntry1] : 83 yo female with history of esrd on hd presents for followup of left upper extremity avf. pt reports difficulty with cannulation of the avf \par pt states that the left foot toe amputation site is now healed

## 2022-10-11 NOTE — PHYSICAL EXAM
[Normal] : no acute distress, well-nourished, well developed, well appearing [Thrill] : thrill [Pulsatile Thrill] : pulsatile thrill [Aneurysm] : no aneurysm [Bleeding] : no bleeding [Hand well perfused] : hand well perfused [Ulcer] : no ulcer [de-identified] : Intact

## 2022-10-20 ENCOUNTER — LABORATORY RESULT (OUTPATIENT)
Age: 82
End: 2022-10-20

## 2022-10-24 PROBLEM — T82.898A INADEQUATE FLOW OF DIALYSIS ARTERIOVENOUS FISTULA: Status: ACTIVE | Noted: 2019-01-15

## 2022-10-24 PROBLEM — N18.6 ESRD (END STAGE RENAL DISEASE): Status: ACTIVE | Noted: 2017-10-23

## 2022-10-25 ENCOUNTER — RESULT REVIEW (OUTPATIENT)
Age: 82
End: 2022-10-25

## 2022-10-25 ENCOUNTER — APPOINTMENT (OUTPATIENT)
Dept: ENDOVASCULAR SURGERY | Facility: CLINIC | Age: 82
End: 2022-10-25

## 2022-10-25 VITALS
BODY MASS INDEX: 21.9 KG/M2 | RESPIRATION RATE: 18 BRPM | WEIGHT: 119 LBS | OXYGEN SATURATION: 95 % | HEART RATE: 66 BPM | TEMPERATURE: 97.9 F | HEIGHT: 62 IN | SYSTOLIC BLOOD PRESSURE: 153 MMHG | DIASTOLIC BLOOD PRESSURE: 94 MMHG

## 2022-10-25 DIAGNOSIS — N18.6 END STAGE RENAL DISEASE: ICD-10-CM

## 2022-10-25 DIAGNOSIS — T82.898A OTHER SPECIFIED COMPLICATION OF VASCULAR PROSTHETIC DEVICES, IMPLANTS AND GRAFTS, INITIAL ENCOUNTER: ICD-10-CM

## 2022-10-25 PROCEDURE — 36907Z: CUSTOM | Mod: 59

## 2022-10-25 PROCEDURE — 36902Z: CUSTOM

## 2022-10-28 NOTE — ASSESSMENT
[Other: _____] : [unfilled] [FreeTextEntry1] : Duplex shows patent AV fistula with 75% stenosis at the distal upper arm with a flow rate of 1307  Given severe stenosis with pulsatile thrill and difficulty with cannulation plan for fistulogram of the left upper extremity

## 2022-10-28 NOTE — REASON FOR VISIT
[Other ___] : a [unfilled] visit for [Difficult Cannulation] : difficult cannulation [Formal Caregiver] : formal caregiver [Prolonged Bleeding] : prolonged bleeding [FreeTextEntry2] : stenosis on duplex

## 2023-01-01 NOTE — ED PROVIDER NOTE - EYES, MLM
Discharge Form    Date of Delivery: 2023; Time of Delivery: 12:00 AM   Date of Discharge: 2023    Delivery Method: Vaginal, Spontaneous [250]    MOTHER  Information for the patient's mother:  Antonia Glass [[id number]]   30 year old   OB History    Para Term  AB Living   1 1 1 0 0 1   SAB IAB Ectopic Molar Multiple Live Births   0 0 0 0 0 1        AURA   Information for the patient's mother:  Antonia Glass [[id number]]   2023, by Last Menstrual Period        Information for the patient's mother:  Antonia Glass [[id number]]   No results found for this or any previous visit.       Information for the patient's mother:  Antonia Glass [[id number]]     No current facility-administered medications for this encounter.        Prenatal labs:   Information for the patient's mother:  Antonia Glass [[id number]]     Lab Results   Component Value Date/Time    RPR Nonreactive 2023 11:51 AM    RPR Nonreactive 2023 10:36 AM    HIV Nonreactive 2023 11:51 AM    HIV Nonreactive 2023 10:36 AM    RUBEL >52023 10:36 AM      Information for the patient's mother:  Antonia Glass [[id number]]   No results found for: \"SYPIGG\"     Pregnancy complications: None    Labor  Rupture Date: 2023  Rupture Time: 12:00 AM  Time of Birth: 4:00 AM    Apgars:   9       Feeding method: breast      Nursery Course: Transitioned well, feeding well, good urine output and bowel movements, no complications     Screenings/ Immunizations:  [place]  Screen: done, results pending    CHD Screening:  Screening complete: Done (23 0400)  Right hand reading %: 99 %  Foot reading %: 100 %  CHD: Normal    Immunizations:   Immunization History   Administered Date(s) Administered    Hep B, adolescent or pediatric 2023       Discharge Exam:   Birth Weight: 3170 g  Discharge Weight:   Wt Readings from Last 1 Encounters:   23 3080 g (29 %, Z= -0.56)*     * Growth percentiles are based on WHO (Boys, 0-2 years)  data.     Weight Change from Birth: -3%   PHYSICAL EXAM  VITALS:   Visit Vitals  Pulse 149   Temp 98.8 °F (37.1 °C) (Axillary)   Resp 50   Ht 49.5 cm Comment: Filed from Delivery Summary   Wt 3080 g   HC 33 cm (12.99\") Comment: Filed from Delivery Summary   BMI 12.55 kg/m²     GENERAL: alert, appropriate behavior,no acute distress.   SKIN: normal turgor, pink, no rash.   No significant jaundice.  HEAD: atraumatic and normocephalic. The anterior fontanel is open and flat.  EYES: The conjunctivae appear normal, red reflexes are seen bilaterally.  EARS: Pinnae normal.  NOSE: There is no nasal flaring, nares patent bilaterally.  THROAT:  The oropharynx is normal.  There is no cleft of the palate.  Chest: Clavicles Intact. Normal Nipple placement  TRUNK AND Spine: There are no lesions on the trunk; there is no dimple over the presacral area. There are no retractions.  LUNGS: The lung fields are clear to auscultation.  HEART: The precordium is quiet. The heart rhythm is grossly regular. S1 and S2 are normal. There are no murmurs.  ABDOMEN: Soft, symmetrical, rounded, cord clear & drying, bowel sounds present, No Masses  GENITALIA: Normal male genitalia with bilateral descended testes.  EXTREMITIES: Moving all 4 extremities. The hip exam is normal. There are no hip clicks or clunks.    NEUROLOGIC: normal tone throughout. No focal deficit nl reflexes    Admission on 2023   Component Date Value    BASE EXCESS / DEFICIT, A* 2023 -5     HCO3, ARTERIAL CORD BLOO* 2023 22     PCO2, ARTERIAL CORD BLOO* 2023 44     PH, ARTERIAL CORD BLOOD * 2023 7.30     PO2, ARTERIAL CORD BLOOD* 2023 28     BASE EXCESS / DEFICIT, V* 2023 -6     HCO3, VENOUS CORD BLOOD * 2023 20 (L)     PCO2, VENOUS CORD BLOOD * 2023 39     PH, VENOUS CORD BLOOD - * 2023 7.31     PO2, VENOUS CORD BLOOD -* 2023 36     ABO/RH(D) 2023 O Rh Positive     BRANDAN, ANTI IGG 2023 Negative      GLUCOSE, BEDSIDE - POINT* 2023 93     GLUCOSE, BEDSIDE - POINT* 2023 34 (LL)     GLUCOSE, BEDSIDE - POINT* 2023 82     GLUCOSE, BEDSIDE - POINT* 2023 72     GLUCOSE, BEDSIDE - POINT* 2023 77     GLUCOSE, BEDSIDE - POINT* 2023 82         Assessment:    2023  male infant born on 20[birth year] at 4:00 AM at Gestational Age: 40w0d via Vaginal, Spontaneous [250]  with APGARS 1 min: 9 and 5 min: 9  Patient born at  6 lb 15.8 oz (3170 g)AGA  to a   Information for the patient's mother:  Antonia Glass [[id number]]   30 year old   OB History    Para Term  AB Living   1 1 1 0 0 1   SAB IAB Ectopic Molar Multiple Live Births   0 0 0 0 0 1       Information for the patient's mother:  Antonia Glass [[id number]]     Lab Results   Component Value Date/Time    RPR Nonreactive 2023 11:51 AM    RPR Nonreactive 2023 10:36 AM    HIV Nonreactive 2023 11:51 AM    HIV Nonreactive 2023 10:36 AM    RUBEL >52023 10:36 AM      Information for the patient's mother:  Antonia Glass [[id number]]   No results found for: \"SYPIGG\"    . Mom is currently Breast. Baby is currently Breast milk only. First void/stool at Urine Occurrence: 1 (23)  Stool Occurrence: 1 (23)Clinical exam stable and vitals within normal limits. All questions answered and red flag signs discussed.      ASSESSMENT:   Male infant born on 20[birth year] at 0400 via  after 40w0d gestation with a birthweight of 6lb 15.8 oz or 3170 g AGA with APGARs 9 and 9 at 1 and 5 minutes of life respectively to 29 yo  mother with a history of gestational diabetes. Therefore, hypoglycemia protocol initiated. Accuchecks so far 93, 34 and glucose gel given, and 82. Mom with hx of treated chlamydia during pregnancy. All other prenatal labs negative, including GBS. MBT O pos, BBT O pos, Noelle negative. Baby is breastfeeding well and had multiple voids and bowel movements.  Clinical exam is stable, and  vitals wnl. Information given about [facility], will give card tomorrow. Transcutaneous bilirubin at 24 hours 3.7 in low intermediate range     Problem List      40 + 0/7 weeks gestation   Breastfeeding   Maternal gestation diabetes         PLAN:  May discharge home today  Follow-up in 2 days or earlier if concerned  Normal  care  Continue breastfeeding every 2-3 hourly on demand  Lactation support  Circumcision declined  24 hour TcB to be completed  3.7 in low intermediate range  CCHD,  screening, and hearing screen passed  If poor feeding, poor activity, temperature instability, call pediatrician.     Addendum: Baby was discharged in stable condition    Tomeka Soliz MD   1:31 PM       Clear bilaterally, Left postsurgical pupil. R pupil reactive to light

## 2023-01-02 ENCOUNTER — EMERGENCY (EMERGENCY)
Facility: HOSPITAL | Age: 83
LOS: 0 days | Discharge: HOME | End: 2023-01-03
Attending: STUDENT IN AN ORGANIZED HEALTH CARE EDUCATION/TRAINING PROGRAM
Payer: MEDICARE

## 2023-01-02 VITALS
RESPIRATION RATE: 18 BRPM | TEMPERATURE: 99 F | HEART RATE: 65 BPM | DIASTOLIC BLOOD PRESSURE: 63 MMHG | HEIGHT: 59 IN | WEIGHT: 110.01 LBS | OXYGEN SATURATION: 95 % | SYSTOLIC BLOOD PRESSURE: 102 MMHG

## 2023-01-02 DIAGNOSIS — N18.6 END STAGE RENAL DISEASE: ICD-10-CM

## 2023-01-02 DIAGNOSIS — R19.7 DIARRHEA, UNSPECIFIED: ICD-10-CM

## 2023-01-02 DIAGNOSIS — I77.0 ARTERIOVENOUS FISTULA, ACQUIRED: Chronic | ICD-10-CM

## 2023-01-02 DIAGNOSIS — Z79.82 LONG TERM (CURRENT) USE OF ASPIRIN: ICD-10-CM

## 2023-01-02 DIAGNOSIS — Z98.890 OTHER SPECIFIED POSTPROCEDURAL STATES: Chronic | ICD-10-CM

## 2023-01-02 DIAGNOSIS — U07.1 COVID-19: ICD-10-CM

## 2023-01-02 DIAGNOSIS — Z79.02 LONG TERM (CURRENT) USE OF ANTITHROMBOTICS/ANTIPLATELETS: ICD-10-CM

## 2023-01-02 DIAGNOSIS — H26.9 UNSPECIFIED CATARACT: Chronic | ICD-10-CM

## 2023-01-02 DIAGNOSIS — Z99.2 DEPENDENCE ON RENAL DIALYSIS: ICD-10-CM

## 2023-01-02 PROCEDURE — 99053 MED SERV 10PM-8AM 24 HR FAC: CPT

## 2023-01-02 PROCEDURE — 99284 EMERGENCY DEPT VISIT MOD MDM: CPT

## 2023-01-02 RX ORDER — SODIUM CHLORIDE 9 MG/ML
500 INJECTION INTRAMUSCULAR; INTRAVENOUS; SUBCUTANEOUS ONCE
Refills: 0 | Status: COMPLETED | OUTPATIENT
Start: 2023-01-02 | End: 2023-01-02

## 2023-01-02 RX ADMIN — SODIUM CHLORIDE 500 MILLILITER(S): 9 INJECTION INTRAMUSCULAR; INTRAVENOUS; SUBCUTANEOUS at 23:52

## 2023-01-02 NOTE — ED PROVIDER NOTE - NS ED ROS FT
Constitutional: See HPI.  Skin: No skin rash.  Except as documented in the HPI, all other systems are negative.

## 2023-01-02 NOTE — ED PROVIDER NOTE - CLINICAL SUMMARY MEDICAL DECISION MAKING FREE TEXT BOX
Patient with reported diarrhea for 3 weeks, well-appearing abdomen soft nondistended benign exam we will obtain CT abdomen pelvis without contrast for further evaluation screening labs and reassess

## 2023-01-02 NOTE — ED ADULT NURSE NOTE - NSIMPLEMENTINTERV_GEN_ALL_ED
Implemented All Fall Risk Interventions:  [place] to call system. Call bell, personal items and telephone within reach. Instruct patient to call for assistance. Room bathroom lighting operational. Non-slip footwear when patient is off stretcher. Physically safe environment: no spills, clutter or unnecessary equipment. Stretcher in lowest position, wheels locked, appropriate side rails in place. Provide visual cue, wrist band, yellow gown, etc. Monitor gait and stability. Monitor for mental status changes and reorient to person, place, and time. Review medications for side effects contributing to fall risk. Reinforce activity limits and safety measures with patient and family.

## 2023-01-02 NOTE — ED PROVIDER NOTE - OBJECTIVE STATEMENT
82-year-old female with chronic interstitial nephritis/end-stage renal disease on hemodialysis Monday Wednesday Friday presenting to the ED with complaints of intermittent diarrhea for the past 3 weeks without associated abdominal pain.  Denies any nausea vomiting denies any fever chills cough congestion or sick contacts.  Denies any other complaints.

## 2023-01-02 NOTE — ED PROVIDER NOTE - NSFOLLOWUPINSTRUCTIONS_ED_ALL_ED_FT
Diarrhea    Diarrhea is frequent loose or watery bowel movements that has many causes. Diarrhea can make you feel weak and cause you to become dehydrated.. Drink clear fluids to prevent dehydration. Eat bland, easy-to-digest foods as tolerated. monitor the amount of wet diapers or voids to ensure you or patient are well hydrated. If diarrhea persists more then 5 days follow up with pmd for possible stool cultures.    SEEK IMMEDIATE MEDICAL CARE IF YOU HAVE ANY OF THE FOLLOWING SYMPTOMS: high fevers, lightheadedness/dizziness, chest pain, black or bloody stools, shortness of breath, severe abdominal or back pain, or any signs of dehydration.

## 2023-01-02 NOTE — ED PROVIDER NOTE - PATIENT PORTAL LINK FT
You can access the FollowMyHealth Patient Portal offered by [facility] by registering at the following website: [email or web address]. By joining [employer] [facility]’s FollowMyHealth portal, you will also be able to view your health information using other applications (apps) compatible with our system.

## 2023-01-02 NOTE — ED ADULT NURSE NOTE - CHIEF COMPLAINT QUOTE
Patient BIB [facility] c/o intermittent diarrhea x 3 weeks. Denies abdominal pain, n/v. Patient has dialysis m/w/f. Last dialysis was Wednesday. Access to left arm. Pmh htn, dialysys, MI, CAD.

## 2023-01-02 NOTE — ED ADULT NURSE NOTE - OBJECTIVE STATEMENT
patient alert and oriented x4. pt c/o intermittent watery diarrhea for the past 3 weeks. denies abd pain, N/V, fever. pt on dialysis MWF.

## 2023-01-02 NOTE — ED ADULT TRIAGE NOTE - CHIEF COMPLAINT QUOTE
Patient BIB [facility] c/o intermittent diarrhea x 3 weeks. Denies abdominal pain, n/v. Patient has dialysis m/w/f. Last dialysis was Wednesday. Access to left arm. Pmh htn, dialysys, MI, CAD.

## 2023-01-02 NOTE — ED PROVIDER NOTE - NS ED MD DISPO DISCHARGE CCDA
Hi [place],  Estee Peck needs a pelvis US in October 2020; I placed the order. She does not want to see GYN.  Thanks,  Aydee    Patient/Caregiver provided printed discharge information.

## 2023-01-03 VITALS
DIASTOLIC BLOOD PRESSURE: 74 MMHG | RESPIRATION RATE: 17 BRPM | SYSTOLIC BLOOD PRESSURE: 120 MMHG | TEMPERATURE: 98 F | HEART RATE: 59 BPM | OXYGEN SATURATION: 95 %

## 2023-01-03 LAB
ALBUMIN SERPL ELPH-MCNC: 2.8 G/DL — LOW (ref 3.3–5)
ALP SERPL-CCNC: 49 U/L — SIGNIFICANT CHANGE UP (ref 40–120)
ALT FLD-CCNC: 52 U/L — SIGNIFICANT CHANGE UP (ref 12–78)
ANION GAP SERPL CALC-SCNC: 17 MMOL/L — SIGNIFICANT CHANGE UP (ref 5–17)
AST SERPL-CCNC: 94 U/L — HIGH (ref 15–37)
BASOPHILS # BLD AUTO: 0.01 K/UL — SIGNIFICANT CHANGE UP (ref 0–0.2)
BASOPHILS NFR BLD AUTO: 0.2 % — SIGNIFICANT CHANGE UP (ref 0–2)
BILIRUB SERPL-MCNC: 0.4 MG/DL — SIGNIFICANT CHANGE UP (ref 0.2–1.2)
BUN SERPL-MCNC: 146 MG/DL — HIGH (ref 7–23)
CALCIUM SERPL-MCNC: 9.4 MG/DL — SIGNIFICANT CHANGE UP (ref 8.5–10.1)
CHLORIDE SERPL-SCNC: 98 MMOL/L — SIGNIFICANT CHANGE UP (ref 96–108)
CO2 SERPL-SCNC: 24 MMOL/L — SIGNIFICANT CHANGE UP (ref 22–31)
CREAT SERPL-MCNC: 15.8 MG/DL — HIGH (ref 0.5–1.3)
EGFR: 2 ML/MIN/1.73M2 — LOW
EOSINOPHIL # BLD AUTO: 0.12 K/UL — SIGNIFICANT CHANGE UP (ref 0–0.5)
EOSINOPHIL NFR BLD AUTO: 2 % — SIGNIFICANT CHANGE UP (ref 0–6)
GLUCOSE SERPL-MCNC: 70 MG/DL — SIGNIFICANT CHANGE UP (ref 70–99)
HCT VFR BLD CALC: 30.4 % — LOW (ref 34.5–45)
HGB BLD-MCNC: 9.7 G/DL — LOW (ref 11.5–15.5)
IMM GRANULOCYTES NFR BLD AUTO: 1.7 % — HIGH (ref 0–0.9)
LIDOCAIN IGE QN: 653 U/L — HIGH (ref 73–393)
LYMPHOCYTES # BLD AUTO: 1.06 K/UL — SIGNIFICANT CHANGE UP (ref 1–3.3)
LYMPHOCYTES # BLD AUTO: 18 % — SIGNIFICANT CHANGE UP (ref 13–44)
MCHC RBC-ENTMCNC: 31.7 PG — SIGNIFICANT CHANGE UP (ref 27–34)
MCHC RBC-ENTMCNC: 31.9 G/DL — LOW (ref 32–36)
MCV RBC AUTO: 99.3 FL — SIGNIFICANT CHANGE UP (ref 80–100)
MONOCYTES # BLD AUTO: 0.8 K/UL — SIGNIFICANT CHANGE UP (ref 0–0.9)
MONOCYTES NFR BLD AUTO: 13.6 % — SIGNIFICANT CHANGE UP (ref 2–14)
NEUTROPHILS # BLD AUTO: 3.8 K/UL — SIGNIFICANT CHANGE UP (ref 1.8–7.4)
NEUTROPHILS NFR BLD AUTO: 64.5 % — SIGNIFICANT CHANGE UP (ref 43–77)
NRBC # BLD: 4 /100 WBCS — HIGH (ref 0–0)
PLATELET # BLD AUTO: 262 K/UL — SIGNIFICANT CHANGE UP (ref 150–400)
POTASSIUM SERPL-MCNC: 5.3 MMOL/L — SIGNIFICANT CHANGE UP (ref 3.5–5.3)
POTASSIUM SERPL-SCNC: 5.3 MMOL/L — SIGNIFICANT CHANGE UP (ref 3.5–5.3)
PROT SERPL-MCNC: 7.5 GM/DL — SIGNIFICANT CHANGE UP (ref 6–8.3)
RAPID RVP RESULT: DETECTED
RBC # BLD: 3.06 M/UL — LOW (ref 3.8–5.2)
RBC # FLD: 14.8 % — HIGH (ref 10.3–14.5)
SARS-COV-2 RNA SPEC QL NAA+PROBE: DETECTED
SODIUM SERPL-SCNC: 139 MMOL/L — SIGNIFICANT CHANGE UP (ref 135–145)
WBC # BLD: 5.89 K/UL — SIGNIFICANT CHANGE UP (ref 3.8–10.5)
WBC # FLD AUTO: 5.89 K/UL — SIGNIFICANT CHANGE UP (ref 3.8–10.5)

## 2023-01-03 PROCEDURE — 74176 CT ABD & PELVIS W/O CONTRAST: CPT | Mod: 26,MA

## 2023-01-03 RX ORDER — DIPHENOXYLATE HCL/ATROPINE 2.5-.025MG
2 TABLET ORAL
Qty: 20 | Refills: 0
Start: 2023-01-03 | End: 2023-01-07

## 2023-01-24 ENCOUNTER — APPOINTMENT (OUTPATIENT)
Dept: VASCULAR SURGERY | Facility: CLINIC | Age: 83
End: 2023-01-24
Payer: MEDICARE

## 2023-01-24 PROCEDURE — 93990 DOPPLER FLOW TESTING: CPT

## 2023-04-25 ENCOUNTER — APPOINTMENT (OUTPATIENT)
Dept: VASCULAR SURGERY | Facility: CLINIC | Age: 83
End: 2023-04-25
Payer: MEDICARE

## 2023-04-25 VITALS
BODY MASS INDEX: 21.53 KG/M2 | SYSTOLIC BLOOD PRESSURE: 133 MMHG | DIASTOLIC BLOOD PRESSURE: 66 MMHG | WEIGHT: 117 LBS | HEART RATE: 75 BPM | HEIGHT: 62 IN

## 2023-04-25 PROCEDURE — 99213 OFFICE O/P EST LOW 20 MIN: CPT

## 2023-04-25 PROCEDURE — 93990 DOPPLER FLOW TESTING: CPT

## 2023-04-30 NOTE — PHYSICAL EXAM
[Thrill] : thrill [Pulsatile Thrill] : pulsatile thrill [Hand well perfused] : hand well perfused [2+] : left 2+ [Normal] : coordination grossly intact, no focal deficits [Aneurysm] : no aneurysm [Bleeding] : no bleeding [Ulcer] : no ulcer [de-identified] : Intact

## 2023-04-30 NOTE — ASSESSMENT
[FreeTextEntry1] : 83 year old female with ESRD currently on hemodialysis via left upper extremity AV fistula\par \par In the office today, patient underwent duplex which demonstrated a patent fistula with mild - moderate stenosis at the proximal stent and mid upper arm with adequate flow.\par \par Given the patient is without issues, we will continue to monitor.\par \par Follow-up 3-4 months with repeat duplex.

## 2023-04-30 NOTE — HISTORY OF PRESENT ILLNESS
[FreeTextEntry1] : 83 year old female with ESRD currently on hemodialysis via left upper extremity AV fistula presents to the office today for follow up. Patient is without complaints at this time.\par Denies any issue with hemodialysis access or excess bleeding.\par

## 2023-05-30 NOTE — ASU DISCHARGE PLAN (ADULT/PEDIATRIC). - FOR NEXT 12 HOURS DO NOT:
Statement Selected Cheilitis Aggressive Treatment: I recommended application of Vaseline or Aquaphor numerous times a day (as often as every hour) and before going to bed. I also prescribed a topical steroid for twice daily use. Use Enhanced Counseling Feature (Automatic): No Hypertriglyceridemia Monitoring: I explained this is common when taking isotretinoin. If this worsens they will contact us. Hypertriglyceridemia Treatment: I explained this is common when taking isotretinoin. If this worsens they will contact us. They may try OTC ibuprofen. Are Labs Available For Review?: Yes Xerosis Normal Treatment: I recommended application of Cetaphil or CeraVe numerous times a day going to bed to all dry areas. Xerosis Aggressive Treatment: I recommended application of Cetaphil or CeraVe numerous times a day going to bed to all dry areas. I also prescribed a topical steroid for twice daily use. Lower Range (In Mg/Kg): 120 Retinoid Dermatitis Normal Treatment: I recommended more frequent application of Cetaphil or CeraVe to the areas of dermatitis. Nosebleeds Normal Treatment: I explained this is common when taking isotretinoin. I recommended saline mist in each nostril multiple times a day. If this worsens they will contact us. Upper Range (In Mg/Kg): 150 Headache Monitoring: I recommended monitoring the headaches for now. There is no evidence of increased intracranial pressure. They were instructed to call if the headaches are worsening. Patient Weight (Optional But Required For Cumulative Dose-Numbers And Decimals Only): 190 Retinoid Dermatitis Aggressive Treatment: I recommended more frequent application of Cetaphil or CeraVe to the areas of dermatitis. I also prescribed a topical steroid for twice daily use until the dermatitis resolves. Weight Units: pounds Target Cumulative Dosage (In Mg/Kg): 135 Hypercholesterolemia Monitoring: I explained this is common when taking isotretinoin. We will monitor closely. Months Of Therapy Completed: 1 Cheilitis Normal Treatment: I recommended application of Vaseline or Aquaphor numerous times a day (as often as every hour) and before going to bed. Dosing Month 1 (Required For Cumulative Dosing): 40mg Daily Counseling Text: I reviewed the side effect in detail. Patient should get monthly blood tests, not donate blood, not drive at night if vision affected, and not share medication. Next Month's Dosage: 80mg Daily Xerosis Normal Treatment: I recommended application of Cetaphil or CeraVe numerous times a day and before going to bed to all dry areas. Detail Level: Zone Female Pregnancy Counseling Text: Female patients should also be on two forms of birth control while taking this medication and for one month after their last dose. Xerosis Aggressive Treatment: I recommended application of Cetaphil or CeraVe numerous times a day and before going to bed to all dry areas. I also prescribed a topical steroid for twice daily use. Pounds Preamble Statement (Weight Entered In Details Tab): Reported Weight in pounds: Kilograms Preamble Statement (Weight Entered In Details Tab): Reported Weight in kilograms: What Is The Patient's Gender: Female Most Recent Beta Hcg (Optional): negative Female Completion Statement: After discussing her treatment course we decided to discontinue isotretinoin therapy at this time. I explained that she would need to continue her birth control methods for at least one month after the last dosage. She should also get a pregnancy test one month after the last dose. She shouldn't donate blood for one month after the last dose. She should call with any new symptoms of depression. Use Therapeutic Ranged Or Therapeutic Target: please select Range or Target Male Completion Statement: After discussing his treatment course we decided to discontinue isotretinoin therapy at this time. He shouldn't donate blood for one month after the last dose. He should call with any new symptoms of depression.

## 2023-07-06 NOTE — PROVIDER CONTACT NOTE (CRITICAL VALUE NOTIFICATION) - RECOMMENDATIONS
No evidence of acute fracture infection on exam today.  I also feel reassured that he may have had a bacterial infection he 1st presented to urgent care the end of June due to his fever, chills, neck swelling, and sore throat which all improved after the antibiotic.  The cough I suspect is potentially post viral cough which we discussed and last for several weeks.  Did complete an x-ray in urgent care on the 3rd which did not show any evidence of pneumonia.  At this time I recommend we continue with symptomatic management with albuterol, Flonase, Tessalon Perles, and Robitussin as needed.  We discussed that I would expect his symptoms may improve by next week, but are unlikely to be fully resolved.  If he has worsening of his symptoms or new symptoms we can further evaluate with repeat chest x-ray to make sure he has not developed a new pneumonia   continue to monitor

## 2023-08-01 ENCOUNTER — APPOINTMENT (OUTPATIENT)
Dept: VASCULAR SURGERY | Facility: CLINIC | Age: 83
End: 2023-08-01
Payer: MEDICARE

## 2023-08-01 PROCEDURE — 93990 DOPPLER FLOW TESTING: CPT

## 2023-08-01 PROCEDURE — XXXXX: CPT | Mod: 1L

## 2023-08-11 NOTE — PHYSICAL EXAM
[Thrill] : thrill [Pulsatile Thrill] : pulsatile thrill [Aneurysm] : no aneurysm [Bleeding] : no bleeding [Hand well perfused] : hand well perfused [Ulcer] : no ulcer [2+] : left 2+ [Normal] : coordination grossly intact, no focal deficits [de-identified] : Intact

## 2023-08-23 NOTE — BRIEF OPERATIVE NOTE - PRIMARY SURGEON
Dr Zen Klein Olanzapine Counseling- I discussed with the patient the common side effects of olanzapine including but are not limited to: lack of energy, dry mouth, increased appetite, sleepiness, tremor, constipation, dizziness, changes in behavior, or restlessness.  Explained that teenagers are more likely to experience headaches, abdominal pain, pain in the arms or legs, tiredness, and sleepiness.  Serious side effects include but are not limited: increased risk of death in elderly patients who are confused, have memory loss, or dementia-related psychosis; hyperglycemia; increased cholesterol and triglycerides; and weight gain.

## 2023-10-12 NOTE — ASU PREOP CHECKLIST - HAIR REMOVAL
Pt. has not begun to attend groups yet is social with some peers in other dayrooms. hair removal not indicated

## 2024-05-07 NOTE — PATIENT PROFILE ADULT. - AS SC [facility] MOBILITY
Patient has been discharged home  AVS and discharge med given and discussed with patient  Pt took along all personal belongings  PIV taken off  Patient will be driven home by family  [place] the [facility] [id number]                              (3) slightly limited

## 2024-07-02 NOTE — H&P PST ADULT - PAIN SCALE PREFERRED, PROFILE
Home Sleep Study.  Gave verbal instructions on how to use device and provided written instructions.  Advised pt to call 911 in the event of a medical emergency and [facility].  Pt understood and stated they would return the device tomorrow by  0900  
numerical 0-10